# Patient Record
Sex: MALE | Race: WHITE | NOT HISPANIC OR LATINO | URBAN - METROPOLITAN AREA
[De-identification: names, ages, dates, MRNs, and addresses within clinical notes are randomized per-mention and may not be internally consistent; named-entity substitution may affect disease eponyms.]

---

## 2017-08-08 ENCOUNTER — OUTPATIENT (OUTPATIENT)
Dept: OUTPATIENT SERVICES | Facility: HOSPITAL | Age: 72
LOS: 1 days | Discharge: HOME | End: 2017-08-08

## 2017-08-08 DIAGNOSIS — L89.90 PRESSURE ULCER OF UNSPECIFIED SITE, UNSPECIFIED STAGE: ICD-10-CM

## 2017-08-09 ENCOUNTER — OUTPATIENT (OUTPATIENT)
Dept: OUTPATIENT SERVICES | Facility: HOSPITAL | Age: 72
LOS: 1 days | Discharge: HOME | End: 2017-08-09
Payer: MEDICARE

## 2017-08-09 ENCOUNTER — APPOINTMENT (OUTPATIENT)
Dept: RADIOLOGY | Facility: HOSPITAL | Age: 72
End: 2017-08-09

## 2017-08-09 DIAGNOSIS — M79.671 PAIN IN RIGHT FOOT: ICD-10-CM

## 2017-08-09 PROBLEM — Z00.00 ENCOUNTER FOR PREVENTIVE HEALTH EXAMINATION: Status: ACTIVE | Noted: 2017-08-09

## 2017-08-09 PROCEDURE — 73630 X-RAY EXAM OF FOOT: CPT | Mod: 26,RT

## 2017-08-10 DIAGNOSIS — I82.409 ACUTE EMBOLISM AND THROMBOSIS OF UNSPECIFIED DEEP VEINS OF UNSPECIFIED LOWER EXTREMITY: ICD-10-CM

## 2017-08-10 DIAGNOSIS — I25.2 OLD MYOCARDIAL INFARCTION: ICD-10-CM

## 2017-08-10 DIAGNOSIS — Z79.4 LONG TERM (CURRENT) USE OF INSULIN: ICD-10-CM

## 2017-08-10 DIAGNOSIS — Z79.899 OTHER LONG TERM (CURRENT) DRUG THERAPY: ICD-10-CM

## 2017-08-10 DIAGNOSIS — Z83.3 FAMILY HISTORY OF DIABETES MELLITUS: ICD-10-CM

## 2017-08-10 DIAGNOSIS — E11.40 TYPE 2 DIABETES MELLITUS WITH DIABETIC NEUROPATHY, UNSPECIFIED: ICD-10-CM

## 2017-08-10 DIAGNOSIS — I10 ESSENTIAL (PRIMARY) HYPERTENSION: ICD-10-CM

## 2017-08-10 DIAGNOSIS — I49.9 CARDIAC ARRHYTHMIA, UNSPECIFIED: ICD-10-CM

## 2017-08-10 DIAGNOSIS — Z99.2 DEPENDENCE ON RENAL DIALYSIS: ICD-10-CM

## 2017-08-10 DIAGNOSIS — Z79.82 LONG TERM (CURRENT) USE OF ASPIRIN: ICD-10-CM

## 2017-08-10 DIAGNOSIS — E11.621 TYPE 2 DIABETES MELLITUS WITH FOOT ULCER: ICD-10-CM

## 2017-08-10 DIAGNOSIS — Z98.49 CATARACT EXTRACTION STATUS, UNSPECIFIED EYE: ICD-10-CM

## 2017-08-10 DIAGNOSIS — Z82.49 FAMILY HISTORY OF ISCHEMIC HEART DISEASE AND OTHER DISEASES OF THE CIRCULATORY SYSTEM: ICD-10-CM

## 2017-08-10 DIAGNOSIS — L97.511 NON-PRESSURE CHRONIC ULCER OF OTHER PART OF RIGHT FOOT LIMITED TO BREAKDOWN OF SKIN: ICD-10-CM

## 2017-08-10 DIAGNOSIS — E11.51 TYPE 2 DIABETES MELLITUS WITH DIABETIC PERIPHERAL ANGIOPATHY WITHOUT GANGRENE: ICD-10-CM

## 2017-08-10 DIAGNOSIS — Z87.891 PERSONAL HISTORY OF NICOTINE DEPENDENCE: ICD-10-CM

## 2017-08-10 DIAGNOSIS — Z95.1 PRESENCE OF AORTOCORONARY BYPASS GRAFT: ICD-10-CM

## 2017-09-29 ENCOUNTER — OUTPATIENT (OUTPATIENT)
Dept: OUTPATIENT SERVICES | Facility: HOSPITAL | Age: 72
LOS: 1 days | Discharge: HOME | End: 2017-09-29

## 2017-09-29 DIAGNOSIS — L89.90 PRESSURE ULCER OF UNSPECIFIED SITE, UNSPECIFIED STAGE: ICD-10-CM

## 2017-10-03 ENCOUNTER — OUTPATIENT (OUTPATIENT)
Dept: OUTPATIENT SERVICES | Facility: HOSPITAL | Age: 72
LOS: 1 days | Discharge: HOME | End: 2017-10-03
Payer: MEDICARE

## 2017-10-03 DIAGNOSIS — E11.40 TYPE 2 DIABETES MELLITUS WITH DIABETIC NEUROPATHY, UNSPECIFIED: ICD-10-CM

## 2017-10-03 DIAGNOSIS — E11.621 TYPE 2 DIABETES MELLITUS WITH FOOT ULCER: ICD-10-CM

## 2017-10-03 DIAGNOSIS — L97.511 NON-PRESSURE CHRONIC ULCER OF OTHER PART OF RIGHT FOOT LIMITED TO BREAKDOWN OF SKIN: ICD-10-CM

## 2017-10-03 DIAGNOSIS — Z99.2 DEPENDENCE ON RENAL DIALYSIS: ICD-10-CM

## 2017-10-03 DIAGNOSIS — Z79.4 LONG TERM (CURRENT) USE OF INSULIN: ICD-10-CM

## 2017-10-03 DIAGNOSIS — I25.2 OLD MYOCARDIAL INFARCTION: ICD-10-CM

## 2017-10-03 DIAGNOSIS — Z79.899 OTHER LONG TERM (CURRENT) DRUG THERAPY: ICD-10-CM

## 2017-10-03 DIAGNOSIS — Z98.49 CATARACT EXTRACTION STATUS, UNSPECIFIED EYE: ICD-10-CM

## 2017-10-03 DIAGNOSIS — Z83.3 FAMILY HISTORY OF DIABETES MELLITUS: ICD-10-CM

## 2017-10-03 DIAGNOSIS — E11.51 TYPE 2 DIABETES MELLITUS WITH DIABETIC PERIPHERAL ANGIOPATHY WITHOUT GANGRENE: ICD-10-CM

## 2017-10-03 DIAGNOSIS — S91.301A UNSPECIFIED OPEN WOUND, RIGHT FOOT, INITIAL ENCOUNTER: ICD-10-CM

## 2017-10-03 DIAGNOSIS — Z82.49 FAMILY HISTORY OF ISCHEMIC HEART DISEASE AND OTHER DISEASES OF THE CIRCULATORY SYSTEM: ICD-10-CM

## 2017-10-03 DIAGNOSIS — Z95.1 PRESENCE OF AORTOCORONARY BYPASS GRAFT: ICD-10-CM

## 2017-10-03 DIAGNOSIS — Z79.82 LONG TERM (CURRENT) USE OF ASPIRIN: ICD-10-CM

## 2017-10-03 DIAGNOSIS — I82.409 ACUTE EMBOLISM AND THROMBOSIS OF UNSPECIFIED DEEP VEINS OF UNSPECIFIED LOWER EXTREMITY: ICD-10-CM

## 2017-10-03 DIAGNOSIS — I49.9 CARDIAC ARRHYTHMIA, UNSPECIFIED: ICD-10-CM

## 2017-10-03 DIAGNOSIS — N18.6 END STAGE RENAL DISEASE: ICD-10-CM

## 2017-10-03 DIAGNOSIS — Z87.891 PERSONAL HISTORY OF NICOTINE DEPENDENCE: ICD-10-CM

## 2017-10-03 DIAGNOSIS — I12.0 HYPERTENSIVE CHRONIC KIDNEY DISEASE WITH STAGE 5 CHRONIC KIDNEY DISEASE OR END STAGE RENAL DISEASE: ICD-10-CM

## 2017-10-03 PROCEDURE — 73630 X-RAY EXAM OF FOOT: CPT | Mod: 26,RT

## 2017-10-06 ENCOUNTER — OUTPATIENT (OUTPATIENT)
Dept: OUTPATIENT SERVICES | Facility: HOSPITAL | Age: 72
LOS: 1 days | Discharge: HOME | End: 2017-10-06

## 2017-10-06 DIAGNOSIS — L89.90 PRESSURE ULCER OF UNSPECIFIED SITE, UNSPECIFIED STAGE: ICD-10-CM

## 2017-10-10 DIAGNOSIS — Z99.2 DEPENDENCE ON RENAL DIALYSIS: ICD-10-CM

## 2017-10-10 DIAGNOSIS — Z79.899 OTHER LONG TERM (CURRENT) DRUG THERAPY: ICD-10-CM

## 2017-10-10 DIAGNOSIS — Z98.49 CATARACT EXTRACTION STATUS, UNSPECIFIED EYE: ICD-10-CM

## 2017-10-10 DIAGNOSIS — N18.6 END STAGE RENAL DISEASE: ICD-10-CM

## 2017-10-10 DIAGNOSIS — I49.9 CARDIAC ARRHYTHMIA, UNSPECIFIED: ICD-10-CM

## 2017-10-10 DIAGNOSIS — E11.51 TYPE 2 DIABETES MELLITUS WITH DIABETIC PERIPHERAL ANGIOPATHY WITHOUT GANGRENE: ICD-10-CM

## 2017-10-10 DIAGNOSIS — E11.621 TYPE 2 DIABETES MELLITUS WITH FOOT ULCER: ICD-10-CM

## 2017-10-10 DIAGNOSIS — I82.409 ACUTE EMBOLISM AND THROMBOSIS OF UNSPECIFIED DEEP VEINS OF UNSPECIFIED LOWER EXTREMITY: ICD-10-CM

## 2017-10-10 DIAGNOSIS — Z79.4 LONG TERM (CURRENT) USE OF INSULIN: ICD-10-CM

## 2017-10-10 DIAGNOSIS — I25.2 OLD MYOCARDIAL INFARCTION: ICD-10-CM

## 2017-10-10 DIAGNOSIS — Z79.82 LONG TERM (CURRENT) USE OF ASPIRIN: ICD-10-CM

## 2017-10-10 DIAGNOSIS — Z95.1 PRESENCE OF AORTOCORONARY BYPASS GRAFT: ICD-10-CM

## 2017-10-10 DIAGNOSIS — E11.40 TYPE 2 DIABETES MELLITUS WITH DIABETIC NEUROPATHY, UNSPECIFIED: ICD-10-CM

## 2017-10-10 DIAGNOSIS — Z87.891 PERSONAL HISTORY OF NICOTINE DEPENDENCE: ICD-10-CM

## 2017-10-10 DIAGNOSIS — L97.511 NON-PRESSURE CHRONIC ULCER OF OTHER PART OF RIGHT FOOT LIMITED TO BREAKDOWN OF SKIN: ICD-10-CM

## 2017-10-10 DIAGNOSIS — H26.9 UNSPECIFIED CATARACT: ICD-10-CM

## 2017-10-10 DIAGNOSIS — I12.0 HYPERTENSIVE CHRONIC KIDNEY DISEASE WITH STAGE 5 CHRONIC KIDNEY DISEASE OR END STAGE RENAL DISEASE: ICD-10-CM

## 2017-10-20 ENCOUNTER — OUTPATIENT (OUTPATIENT)
Dept: OUTPATIENT SERVICES | Facility: HOSPITAL | Age: 72
LOS: 1 days | Discharge: HOME | End: 2017-10-20

## 2017-10-20 DIAGNOSIS — L89.90 PRESSURE ULCER OF UNSPECIFIED SITE, UNSPECIFIED STAGE: ICD-10-CM

## 2017-10-24 DIAGNOSIS — E11.40 TYPE 2 DIABETES MELLITUS WITH DIABETIC NEUROPATHY, UNSPECIFIED: ICD-10-CM

## 2017-10-24 DIAGNOSIS — N18.6 END STAGE RENAL DISEASE: ICD-10-CM

## 2017-10-24 DIAGNOSIS — I49.9 CARDIAC ARRHYTHMIA, UNSPECIFIED: ICD-10-CM

## 2017-10-24 DIAGNOSIS — E11.51 TYPE 2 DIABETES MELLITUS WITH DIABETIC PERIPHERAL ANGIOPATHY WITHOUT GANGRENE: ICD-10-CM

## 2017-10-24 DIAGNOSIS — L97.511 NON-PRESSURE CHRONIC ULCER OF OTHER PART OF RIGHT FOOT LIMITED TO BREAKDOWN OF SKIN: ICD-10-CM

## 2017-10-24 DIAGNOSIS — Z79.82 LONG TERM (CURRENT) USE OF ASPIRIN: ICD-10-CM

## 2017-10-24 DIAGNOSIS — I25.2 OLD MYOCARDIAL INFARCTION: ICD-10-CM

## 2017-10-24 DIAGNOSIS — Z99.2 DEPENDENCE ON RENAL DIALYSIS: ICD-10-CM

## 2017-10-24 DIAGNOSIS — Z79.4 LONG TERM (CURRENT) USE OF INSULIN: ICD-10-CM

## 2017-10-24 DIAGNOSIS — Z87.891 PERSONAL HISTORY OF NICOTINE DEPENDENCE: ICD-10-CM

## 2017-10-24 DIAGNOSIS — H26.9 UNSPECIFIED CATARACT: ICD-10-CM

## 2017-10-24 DIAGNOSIS — Z79.899 OTHER LONG TERM (CURRENT) DRUG THERAPY: ICD-10-CM

## 2017-10-24 DIAGNOSIS — Z98.49 CATARACT EXTRACTION STATUS, UNSPECIFIED EYE: ICD-10-CM

## 2017-10-24 DIAGNOSIS — E11.621 TYPE 2 DIABETES MELLITUS WITH FOOT ULCER: ICD-10-CM

## 2017-10-24 DIAGNOSIS — I82.409 ACUTE EMBOLISM AND THROMBOSIS OF UNSPECIFIED DEEP VEINS OF UNSPECIFIED LOWER EXTREMITY: ICD-10-CM

## 2017-10-24 DIAGNOSIS — Z95.1 PRESENCE OF AORTOCORONARY BYPASS GRAFT: ICD-10-CM

## 2017-10-24 DIAGNOSIS — I12.0 HYPERTENSIVE CHRONIC KIDNEY DISEASE WITH STAGE 5 CHRONIC KIDNEY DISEASE OR END STAGE RENAL DISEASE: ICD-10-CM

## 2017-10-27 ENCOUNTER — OUTPATIENT (OUTPATIENT)
Dept: OUTPATIENT SERVICES | Facility: HOSPITAL | Age: 72
LOS: 1 days | Discharge: HOME | End: 2017-10-27

## 2017-10-27 DIAGNOSIS — L89.90 PRESSURE ULCER OF UNSPECIFIED SITE, UNSPECIFIED STAGE: ICD-10-CM

## 2017-10-31 DIAGNOSIS — E11.51 TYPE 2 DIABETES MELLITUS WITH DIABETIC PERIPHERAL ANGIOPATHY WITHOUT GANGRENE: ICD-10-CM

## 2017-10-31 DIAGNOSIS — N18.6 END STAGE RENAL DISEASE: ICD-10-CM

## 2017-10-31 DIAGNOSIS — Z79.82 LONG TERM (CURRENT) USE OF ASPIRIN: ICD-10-CM

## 2017-10-31 DIAGNOSIS — I49.9 CARDIAC ARRHYTHMIA, UNSPECIFIED: ICD-10-CM

## 2017-10-31 DIAGNOSIS — L97.511 NON-PRESSURE CHRONIC ULCER OF OTHER PART OF RIGHT FOOT LIMITED TO BREAKDOWN OF SKIN: ICD-10-CM

## 2017-10-31 DIAGNOSIS — E11.621 TYPE 2 DIABETES MELLITUS WITH FOOT ULCER: ICD-10-CM

## 2017-10-31 DIAGNOSIS — Z79.4 LONG TERM (CURRENT) USE OF INSULIN: ICD-10-CM

## 2017-10-31 DIAGNOSIS — Z99.2 DEPENDENCE ON RENAL DIALYSIS: ICD-10-CM

## 2017-10-31 DIAGNOSIS — I12.0 HYPERTENSIVE CHRONIC KIDNEY DISEASE WITH STAGE 5 CHRONIC KIDNEY DISEASE OR END STAGE RENAL DISEASE: ICD-10-CM

## 2017-10-31 DIAGNOSIS — I25.2 OLD MYOCARDIAL INFARCTION: ICD-10-CM

## 2017-10-31 DIAGNOSIS — E11.40 TYPE 2 DIABETES MELLITUS WITH DIABETIC NEUROPATHY, UNSPECIFIED: ICD-10-CM

## 2017-10-31 DIAGNOSIS — Z79.899 OTHER LONG TERM (CURRENT) DRUG THERAPY: ICD-10-CM

## 2017-10-31 DIAGNOSIS — H26.9 UNSPECIFIED CATARACT: ICD-10-CM

## 2017-10-31 DIAGNOSIS — Z95.1 PRESENCE OF AORTOCORONARY BYPASS GRAFT: ICD-10-CM

## 2020-01-01 ENCOUNTER — INPATIENT (INPATIENT)
Facility: HOSPITAL | Age: 75
LOS: 2 days | End: 2020-07-05
Attending: INTERNAL MEDICINE | Admitting: INTERNAL MEDICINE
Payer: MEDICARE

## 2020-01-01 ENCOUNTER — OUTPATIENT (OUTPATIENT)
Dept: OUTPATIENT SERVICES | Facility: HOSPITAL | Age: 75
LOS: 1 days | Discharge: HOME | End: 2020-01-01

## 2020-01-01 ENCOUNTER — OUTPATIENT (OUTPATIENT)
Dept: OUTPATIENT SERVICES | Facility: HOSPITAL | Age: 75
LOS: 1 days | Discharge: HOME | End: 2020-01-01
Payer: MEDICARE

## 2020-01-01 ENCOUNTER — APPOINTMENT (OUTPATIENT)
Dept: MRI IMAGING | Facility: HOSPITAL | Age: 75
End: 2020-01-01

## 2020-01-01 VITALS — HEIGHT: 66 IN | WEIGHT: 149.91 LBS

## 2020-01-01 DIAGNOSIS — E87.4 MIXED DISORDER OF ACID-BASE BALANCE: ICD-10-CM

## 2020-01-01 DIAGNOSIS — Z79.4 LONG TERM (CURRENT) USE OF INSULIN: ICD-10-CM

## 2020-01-01 DIAGNOSIS — Z86.718 PERSONAL HISTORY OF OTHER VENOUS THROMBOSIS AND EMBOLISM: ICD-10-CM

## 2020-01-01 DIAGNOSIS — N18.6 END STAGE RENAL DISEASE: ICD-10-CM

## 2020-01-01 DIAGNOSIS — I49.9 CARDIAC ARRHYTHMIA, UNSPECIFIED: ICD-10-CM

## 2020-01-01 DIAGNOSIS — L89.90 PRESSURE ULCER OF UNSPECIFIED SITE, UNSPECIFIED STAGE: ICD-10-CM

## 2020-01-01 DIAGNOSIS — Z87.891 PERSONAL HISTORY OF NICOTINE DEPENDENCE: ICD-10-CM

## 2020-01-01 DIAGNOSIS — J98.11 ATELECTASIS: ICD-10-CM

## 2020-01-01 DIAGNOSIS — E11.22 TYPE 2 DIABETES MELLITUS WITH DIABETIC CHRONIC KIDNEY DISEASE: ICD-10-CM

## 2020-01-01 DIAGNOSIS — E11.621 TYPE 2 DIABETES MELLITUS WITH FOOT ULCER: ICD-10-CM

## 2020-01-01 DIAGNOSIS — J96.02 ACUTE RESPIRATORY FAILURE WITH HYPERCAPNIA: ICD-10-CM

## 2020-01-01 DIAGNOSIS — E11.40 TYPE 2 DIABETES MELLITUS WITH DIABETIC NEUROPATHY, UNSPECIFIED: ICD-10-CM

## 2020-01-01 DIAGNOSIS — Z79.899 OTHER LONG TERM (CURRENT) DRUG THERAPY: ICD-10-CM

## 2020-01-01 DIAGNOSIS — Z79.01 LONG TERM (CURRENT) USE OF ANTICOAGULANTS: ICD-10-CM

## 2020-01-01 DIAGNOSIS — Z99.2 DEPENDENCE ON RENAL DIALYSIS: ICD-10-CM

## 2020-01-01 DIAGNOSIS — L89.152 PRESSURE ULCER OF SACRAL REGION, STAGE 2: ICD-10-CM

## 2020-01-01 DIAGNOSIS — I46.9 CARDIAC ARREST, CAUSE UNSPECIFIED: ICD-10-CM

## 2020-01-01 DIAGNOSIS — I51.7 CARDIOMEGALY: ICD-10-CM

## 2020-01-01 DIAGNOSIS — I21.4 NON-ST ELEVATION (NSTEMI) MYOCARDIAL INFARCTION: ICD-10-CM

## 2020-01-01 DIAGNOSIS — M86.171 OTHER ACUTE OSTEOMYELITIS, RIGHT ANKLE AND FOOT: ICD-10-CM

## 2020-01-01 DIAGNOSIS — L97.511 NON-PRESSURE CHRONIC ULCER OF OTHER PART OF RIGHT FOOT LIMITED TO BREAKDOWN OF SKIN: ICD-10-CM

## 2020-01-01 DIAGNOSIS — S22.41XA MULTIPLE FRACTURES OF RIBS, RIGHT SIDE, INITIAL ENCOUNTER FOR CLOSED FRACTURE: ICD-10-CM

## 2020-01-01 DIAGNOSIS — J96.01 ACUTE RESPIRATORY FAILURE WITH HYPOXIA: ICD-10-CM

## 2020-01-01 DIAGNOSIS — I12.0 HYPERTENSIVE CHRONIC KIDNEY DISEASE WITH STAGE 5 CHRONIC KIDNEY DISEASE OR END STAGE RENAL DISEASE: ICD-10-CM

## 2020-01-01 DIAGNOSIS — I95.9 HYPOTENSION, UNSPECIFIED: ICD-10-CM

## 2020-01-01 DIAGNOSIS — M86.9 OSTEOMYELITIS, UNSPECIFIED: ICD-10-CM

## 2020-01-01 DIAGNOSIS — E11.65 TYPE 2 DIABETES MELLITUS WITH HYPERGLYCEMIA: ICD-10-CM

## 2020-01-01 DIAGNOSIS — Z82.49 FAMILY HISTORY OF ISCHEMIC HEART DISEASE AND OTHER DISEASES OF THE CIRCULATORY SYSTEM: ICD-10-CM

## 2020-01-01 DIAGNOSIS — Z66 DO NOT RESUSCITATE: ICD-10-CM

## 2020-01-01 DIAGNOSIS — I25.2 OLD MYOCARDIAL INFARCTION: ICD-10-CM

## 2020-01-01 DIAGNOSIS — Z98.49 CATARACT EXTRACTION STATUS, UNSPECIFIED EYE: ICD-10-CM

## 2020-01-01 DIAGNOSIS — Z95.1 PRESENCE OF AORTOCORONARY BYPASS GRAFT: ICD-10-CM

## 2020-01-01 DIAGNOSIS — J90 PLEURAL EFFUSION, NOT ELSEWHERE CLASSIFIED: ICD-10-CM

## 2020-01-01 DIAGNOSIS — I46.8 CARDIAC ARREST DUE TO OTHER UNDERLYING CONDITION: ICD-10-CM

## 2020-01-01 DIAGNOSIS — I48.91 UNSPECIFIED ATRIAL FIBRILLATION: ICD-10-CM

## 2020-01-01 DIAGNOSIS — I25.10 ATHEROSCLEROTIC HEART DISEASE OF NATIVE CORONARY ARTERY WITHOUT ANGINA PECTORIS: ICD-10-CM

## 2020-01-01 DIAGNOSIS — M79.674 PAIN IN RIGHT TOE(S): ICD-10-CM
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
## 2020-01-01 DIAGNOSIS — J69.0 PNEUMONITIS DUE TO INHALATION OF FOOD AND VOMIT: ICD-10-CM

## 2020-01-01 DIAGNOSIS — E87.70 FLUID OVERLOAD, UNSPECIFIED: ICD-10-CM
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
## 2020-01-01 DIAGNOSIS — G93.1 ANOXIC BRAIN DAMAGE, NOT ELSEWHERE CLASSIFIED: ICD-10-CM

## 2020-01-01 LAB
A1C WITH ESTIMATED AVERAGE GLUCOSE RESULT: 9.1 % — HIGH (ref 4–5.6)
ALBUMIN SERPL ELPH-MCNC: 2.1 G/DL — LOW (ref 3.3–5)
ALBUMIN SERPL ELPH-MCNC: 2.4 G/DL — LOW (ref 3.3–5)
ALBUMIN SERPL ELPH-MCNC: 2.6 G/DL — LOW (ref 3.3–5)
ALBUMIN SERPL ELPH-MCNC: 2.7 G/DL — LOW (ref 3.3–5)
ALP SERPL-CCNC: 111 U/L — SIGNIFICANT CHANGE UP (ref 40–120)
ALP SERPL-CCNC: 153 U/L — HIGH (ref 40–120)
ALP SERPL-CCNC: 162 U/L — HIGH (ref 40–120)
ALP SERPL-CCNC: 172 U/L — HIGH (ref 40–120)
ALT FLD-CCNC: 53 U/L — SIGNIFICANT CHANGE UP (ref 12–78)
ALT FLD-CCNC: 61 U/L — SIGNIFICANT CHANGE UP (ref 12–78)
ALT FLD-CCNC: 68 U/L — SIGNIFICANT CHANGE UP (ref 12–78)
ALT FLD-CCNC: 69 U/L — SIGNIFICANT CHANGE UP (ref 12–78)
ANION GAP SERPL CALC-SCNC: 10 MMOL/L — SIGNIFICANT CHANGE UP (ref 5–17)
ANION GAP SERPL CALC-SCNC: 12 MMOL/L — SIGNIFICANT CHANGE UP (ref 5–17)
ANION GAP SERPL CALC-SCNC: 13 MMOL/L — SIGNIFICANT CHANGE UP (ref 5–17)
ANION GAP SERPL CALC-SCNC: 14 MMOL/L — SIGNIFICANT CHANGE UP (ref 5–17)
ANION GAP SERPL CALC-SCNC: 14 MMOL/L — SIGNIFICANT CHANGE UP (ref 5–17)
ANISOCYTOSIS BLD QL: SLIGHT — SIGNIFICANT CHANGE UP
APTT BLD: 28.8 SEC — SIGNIFICANT CHANGE UP (ref 27.5–35.5)
APTT BLD: 30.7 SEC — SIGNIFICANT CHANGE UP (ref 27.5–35.5)
APTT BLD: 30.8 SEC — SIGNIFICANT CHANGE UP (ref 27.5–35.5)
APTT BLD: 48.6 SEC — HIGH (ref 27.5–35.5)
APTT BLD: 52.3 SEC — HIGH (ref 27.5–35.5)
APTT BLD: 56.1 SEC — HIGH (ref 27.5–35.5)
APTT BLD: 58.7 SEC — HIGH (ref 27.5–35.5)
APTT BLD: 78 SEC — HIGH (ref 27.5–35.5)
APTT BLD: 94.8 SEC — HIGH (ref 27.5–35.5)
AST SERPL-CCNC: 109 U/L — HIGH (ref 15–37)
AST SERPL-CCNC: 82 U/L — HIGH (ref 15–37)
AST SERPL-CCNC: 86 U/L — HIGH (ref 15–37)
AST SERPL-CCNC: 98 U/L — HIGH (ref 15–37)
B-OH-BUTYR SERPL-SCNC: 0.9 MMOL/L — HIGH
BASE EXCESS BLDA CALC-SCNC: -7.2 MMOL/L — LOW (ref -2–2)
BASE EXCESS BLDA CALC-SCNC: 1.9 MMOL/L — SIGNIFICANT CHANGE UP (ref -2–2)
BASE EXCESS BLDA CALC-SCNC: 2 MMOL/L — SIGNIFICANT CHANGE UP (ref -2–2)
BASE EXCESS BLDV CALC-SCNC: 1.6 MMOL/L — SIGNIFICANT CHANGE UP (ref -2–2)
BASOPHILS # BLD AUTO: 0 K/UL — SIGNIFICANT CHANGE UP (ref 0–0.2)
BASOPHILS # BLD AUTO: 0.01 K/UL — SIGNIFICANT CHANGE UP (ref 0–0.2)
BASOPHILS NFR BLD AUTO: 0 % — SIGNIFICANT CHANGE UP (ref 0–2)
BASOPHILS NFR BLD AUTO: 0.1 % — SIGNIFICANT CHANGE UP (ref 0–2)
BILIRUB SERPL-MCNC: 0.5 MG/DL — SIGNIFICANT CHANGE UP (ref 0.2–1.2)
BILIRUB SERPL-MCNC: 0.6 MG/DL — SIGNIFICANT CHANGE UP (ref 0.2–1.2)
BILIRUB SERPL-MCNC: 0.6 MG/DL — SIGNIFICANT CHANGE UP (ref 0.2–1.2)
BILIRUB SERPL-MCNC: 0.7 MG/DL — SIGNIFICANT CHANGE UP (ref 0.2–1.2)
BLD GP AB SCN SERPL QL: SIGNIFICANT CHANGE UP
BLOOD GAS COMMENTS, VENOUS: SIGNIFICANT CHANGE UP
BLOOD GAS COMMENTS, VENOUS: SIGNIFICANT CHANGE UP
BLOOD GAS COMMENTS: SIGNIFICANT CHANGE UP
BLOOD GAS SOURCE: SIGNIFICANT CHANGE UP
BUN SERPL-MCNC: 51 MG/DL — HIGH (ref 7–23)
BUN SERPL-MCNC: 53 MG/DL — HIGH (ref 7–23)
BUN SERPL-MCNC: 53 MG/DL — HIGH (ref 7–23)
BUN SERPL-MCNC: 62 MG/DL — HIGH (ref 7–23)
BUN SERPL-MCNC: 75 MG/DL — HIGH (ref 7–23)
CALCIUM SERPL-MCNC: 8.4 MG/DL — LOW (ref 8.5–10.1)
CALCIUM SERPL-MCNC: 8.6 MG/DL — SIGNIFICANT CHANGE UP (ref 8.5–10.1)
CALCIUM SERPL-MCNC: 9 MG/DL — SIGNIFICANT CHANGE UP (ref 8.5–10.1)
CALCIUM SERPL-MCNC: 9 MG/DL — SIGNIFICANT CHANGE UP (ref 8.5–10.1)
CALCIUM SERPL-MCNC: 9.2 MG/DL — SIGNIFICANT CHANGE UP (ref 8.5–10.1)
CHLORIDE SERPL-SCNC: 101 MMOL/L — SIGNIFICANT CHANGE UP (ref 96–108)
CHLORIDE SERPL-SCNC: 95 MMOL/L — LOW (ref 96–108)
CHLORIDE SERPL-SCNC: 96 MMOL/L — SIGNIFICANT CHANGE UP (ref 96–108)
CHLORIDE SERPL-SCNC: 97 MMOL/L — SIGNIFICANT CHANGE UP (ref 96–108)
CHLORIDE SERPL-SCNC: 98 MMOL/L — SIGNIFICANT CHANGE UP (ref 96–108)
CK MB BLD-MCNC: 4.9 % — HIGH (ref 0–3.5)
CK MB CFR SERPL CALC: 23.5 NG/ML — HIGH (ref 0.5–3.6)
CK SERPL-CCNC: 430 U/L — HIGH (ref 26–308)
CK SERPL-CCNC: 478 U/L — HIGH (ref 26–308)
CO2 SERPL-SCNC: 23 MMOL/L — SIGNIFICANT CHANGE UP (ref 22–31)
CO2 SERPL-SCNC: 24 MMOL/L — SIGNIFICANT CHANGE UP (ref 22–31)
CO2 SERPL-SCNC: 26 MMOL/L — SIGNIFICANT CHANGE UP (ref 22–31)
CO2 SERPL-SCNC: 28 MMOL/L — SIGNIFICANT CHANGE UP (ref 22–31)
CO2 SERPL-SCNC: 28 MMOL/L — SIGNIFICANT CHANGE UP (ref 22–31)
CREAT SERPL-MCNC: 5.6 MG/DL — HIGH (ref 0.5–1.3)
CREAT SERPL-MCNC: 5.61 MG/DL — HIGH (ref 0.5–1.3)
CREAT SERPL-MCNC: 5.71 MG/DL — HIGH (ref 0.5–1.3)
CREAT SERPL-MCNC: 5.92 MG/DL — HIGH (ref 0.5–1.3)
CREAT SERPL-MCNC: 6.57 MG/DL — HIGH (ref 0.5–1.3)
CRP SERPL-MCNC: 1.16 MG/DL — HIGH (ref 0–0.4)
CULTURE RESULTS: SIGNIFICANT CHANGE UP
EOSINOPHIL # BLD AUTO: 0 K/UL — SIGNIFICANT CHANGE UP (ref 0–0.5)
EOSINOPHIL # BLD AUTO: 0.01 K/UL — SIGNIFICANT CHANGE UP (ref 0–0.5)
EOSINOPHIL NFR BLD AUTO: 0 % — SIGNIFICANT CHANGE UP (ref 0–6)
EOSINOPHIL NFR BLD AUTO: 0.1 % — SIGNIFICANT CHANGE UP (ref 0–6)
ESTIMATED AVERAGE GLUCOSE: 214 MG/DL — HIGH (ref 68–114)
FERRITIN SERPL-MCNC: 1569 NG/ML — HIGH (ref 30–400)
GAS PNL BLDV: SIGNIFICANT CHANGE UP
GLUCOSE BLDC GLUCOMTR-MCNC: 101 MG/DL — HIGH (ref 70–99)
GLUCOSE BLDC GLUCOMTR-MCNC: 114 MG/DL — HIGH (ref 70–99)
GLUCOSE BLDC GLUCOMTR-MCNC: 118 MG/DL — HIGH (ref 70–99)
GLUCOSE BLDC GLUCOMTR-MCNC: 124 MG/DL — HIGH (ref 70–99)
GLUCOSE BLDC GLUCOMTR-MCNC: 130 MG/DL — HIGH (ref 70–99)
GLUCOSE BLDC GLUCOMTR-MCNC: 140 MG/DL — HIGH (ref 70–99)
GLUCOSE BLDC GLUCOMTR-MCNC: 150 MG/DL — HIGH (ref 70–99)
GLUCOSE BLDC GLUCOMTR-MCNC: 152 MG/DL — HIGH (ref 70–99)
GLUCOSE BLDC GLUCOMTR-MCNC: 163 MG/DL — HIGH (ref 70–99)
GLUCOSE BLDC GLUCOMTR-MCNC: 173 MG/DL — HIGH (ref 70–99)
GLUCOSE BLDC GLUCOMTR-MCNC: 178 MG/DL — HIGH (ref 70–99)
GLUCOSE BLDC GLUCOMTR-MCNC: 185 MG/DL — HIGH (ref 70–99)
GLUCOSE BLDC GLUCOMTR-MCNC: 196 MG/DL — HIGH (ref 70–99)
GLUCOSE BLDC GLUCOMTR-MCNC: 208 MG/DL — HIGH (ref 70–99)
GLUCOSE BLDC GLUCOMTR-MCNC: 216 MG/DL — HIGH (ref 70–99)
GLUCOSE BLDC GLUCOMTR-MCNC: 222 MG/DL — HIGH (ref 70–99)
GLUCOSE BLDC GLUCOMTR-MCNC: 230 MG/DL — HIGH (ref 70–99)
GLUCOSE BLDC GLUCOMTR-MCNC: 254 MG/DL — HIGH (ref 70–99)
GLUCOSE BLDC GLUCOMTR-MCNC: 258 MG/DL — HIGH (ref 70–99)
GLUCOSE BLDC GLUCOMTR-MCNC: 369 MG/DL — HIGH (ref 70–99)
GLUCOSE BLDC GLUCOMTR-MCNC: 370 MG/DL — HIGH (ref 70–99)
GLUCOSE BLDC GLUCOMTR-MCNC: 407 MG/DL — HIGH (ref 70–99)
GLUCOSE BLDC GLUCOMTR-MCNC: 466 MG/DL — CRITICAL HIGH (ref 70–99)
GLUCOSE BLDC GLUCOMTR-MCNC: 479 MG/DL — CRITICAL HIGH (ref 70–99)
GLUCOSE BLDC GLUCOMTR-MCNC: 484 MG/DL — CRITICAL HIGH (ref 70–99)
GLUCOSE BLDC GLUCOMTR-MCNC: 488 MG/DL — CRITICAL HIGH (ref 70–99)
GLUCOSE BLDC GLUCOMTR-MCNC: 504 MG/DL — CRITICAL HIGH (ref 70–99)
GLUCOSE BLDC GLUCOMTR-MCNC: 65 MG/DL — LOW (ref 70–99)
GLUCOSE BLDC GLUCOMTR-MCNC: 69 MG/DL — LOW (ref 70–99)
GLUCOSE SERPL-MCNC: 110 MG/DL — HIGH (ref 70–99)
GLUCOSE SERPL-MCNC: 183 MG/DL — HIGH (ref 70–99)
GLUCOSE SERPL-MCNC: 299 MG/DL — HIGH (ref 70–99)
GLUCOSE SERPL-MCNC: 498 MG/DL — CRITICAL HIGH (ref 70–99)
GLUCOSE SERPL-MCNC: 498 MG/DL — CRITICAL HIGH (ref 70–99)
GRAM STN FLD: SIGNIFICANT CHANGE UP
GRAM STN FLD: SIGNIFICANT CHANGE UP
HCO3 BLDA-SCNC: 23 MMOL/L — SIGNIFICANT CHANGE UP (ref 21–29)
HCO3 BLDA-SCNC: 25 MMOL/L — SIGNIFICANT CHANGE UP (ref 21–29)
HCO3 BLDA-SCNC: 27 MMOL/L — SIGNIFICANT CHANGE UP (ref 21–29)
HCO3 BLDV-SCNC: 28 MMOL/L — SIGNIFICANT CHANGE UP (ref 21–29)
HCT VFR BLD CALC: 26.8 % — LOW (ref 39–50)
HCT VFR BLD CALC: 31.6 % — LOW (ref 39–50)
HCT VFR BLD CALC: 31.6 % — LOW (ref 39–50)
HCT VFR BLD CALC: 31.9 % — LOW (ref 39–50)
HCT VFR BLD CALC: 31.9 % — LOW (ref 39–50)
HGB BLD-MCNC: 10.4 G/DL — LOW (ref 13–17)
HGB BLD-MCNC: 10.6 G/DL — LOW (ref 13–17)
HGB BLD-MCNC: 10.7 G/DL — LOW (ref 13–17)
HGB BLD-MCNC: 9.1 G/DL — LOW (ref 13–17)
HGB BLD-MCNC: 9.9 G/DL — LOW (ref 13–17)
HOROWITZ INDEX BLDA+IHG-RTO: 100 — SIGNIFICANT CHANGE UP
HOROWITZ INDEX BLDA+IHG-RTO: 100 — SIGNIFICANT CHANGE UP
HOROWITZ INDEX BLDA+IHG-RTO: 40 — SIGNIFICANT CHANGE UP
HOROWITZ INDEX BLDV+IHG-RTO: 100 — SIGNIFICANT CHANGE UP
HYPOCHROMIA BLD QL: SLIGHT — SIGNIFICANT CHANGE UP
IMM GRANULOCYTES NFR BLD AUTO: 0.5 % — SIGNIFICANT CHANGE UP (ref 0–1.5)
INR BLD: 2.01 RATIO — HIGH (ref 0.88–1.16)
INR BLD: 2.05 RATIO — HIGH (ref 0.88–1.16)
LACTATE SERPL-SCNC: 4 MMOL/L — CRITICAL HIGH (ref 0.7–2)
LDH SERPL L TO P-CCNC: 407 U/L — HIGH (ref 50–242)
LYMPHOCYTES # BLD AUTO: 0.22 K/UL — LOW (ref 1–3.3)
LYMPHOCYTES # BLD AUTO: 0.41 K/UL — LOW (ref 1–3.3)
LYMPHOCYTES # BLD AUTO: 2.7 % — LOW (ref 13–44)
LYMPHOCYTES # BLD AUTO: 4 % — LOW (ref 13–44)
MACROCYTES BLD QL: SLIGHT — SIGNIFICANT CHANGE UP
MAGNESIUM SERPL-MCNC: 2.1 MG/DL — SIGNIFICANT CHANGE UP (ref 1.6–2.6)
MAGNESIUM SERPL-MCNC: 2.1 MG/DL — SIGNIFICANT CHANGE UP (ref 1.6–2.6)
MAGNESIUM SERPL-MCNC: 2.4 MG/DL — SIGNIFICANT CHANGE UP (ref 1.6–2.6)
MAGNESIUM SERPL-MCNC: 2.4 MG/DL — SIGNIFICANT CHANGE UP (ref 1.6–2.6)
MANUAL SMEAR VERIFICATION: SIGNIFICANT CHANGE UP
MCHC RBC-ENTMCNC: 31 GM/DL — LOW (ref 32–36)
MCHC RBC-ENTMCNC: 32.9 GM/DL — SIGNIFICANT CHANGE UP (ref 32–36)
MCHC RBC-ENTMCNC: 33.5 GM/DL — SIGNIFICANT CHANGE UP (ref 32–36)
MCHC RBC-ENTMCNC: 33.5 GM/DL — SIGNIFICANT CHANGE UP (ref 32–36)
MCHC RBC-ENTMCNC: 34 GM/DL — SIGNIFICANT CHANGE UP (ref 32–36)
MCHC RBC-ENTMCNC: 35.4 PG — HIGH (ref 27–34)
MCHC RBC-ENTMCNC: 35.5 PG — HIGH (ref 27–34)
MCHC RBC-ENTMCNC: 35.5 PG — HIGH (ref 27–34)
MCHC RBC-ENTMCNC: 35.7 PG — HIGH (ref 27–34)
MCHC RBC-ENTMCNC: 36 PG — HIGH (ref 27–34)
MCV RBC AUTO: 105.9 FL — HIGH (ref 80–100)
MCV RBC AUTO: 106 FL — HIGH (ref 80–100)
MCV RBC AUTO: 106.4 FL — HIGH (ref 80–100)
MCV RBC AUTO: 107.5 FL — HIGH (ref 80–100)
MCV RBC AUTO: 114.3 FL — HIGH (ref 80–100)
METAMYELOCYTES # FLD: 8 % — HIGH (ref 0–0)
MICROCYTES BLD QL: SLIGHT — SIGNIFICANT CHANGE UP
MONOCYTES # BLD AUTO: 0.21 K/UL — SIGNIFICANT CHANGE UP (ref 0–0.9)
MONOCYTES # BLD AUTO: 0.41 K/UL — SIGNIFICANT CHANGE UP (ref 0–0.9)
MONOCYTES NFR BLD AUTO: 2.6 % — SIGNIFICANT CHANGE UP (ref 2–14)
MONOCYTES NFR BLD AUTO: 4 % — SIGNIFICANT CHANGE UP (ref 2–14)
NEUTROPHILS # BLD AUTO: 7.59 K/UL — HIGH (ref 1.8–7.4)
NEUTROPHILS # BLD AUTO: 8.57 K/UL — HIGH (ref 1.8–7.4)
NEUTROPHILS NFR BLD AUTO: 30 % — LOW (ref 43–77)
NEUTROPHILS NFR BLD AUTO: 94 % — HIGH (ref 43–77)
NEUTS BAND # BLD: 54 % — HIGH (ref 0–8)
NRBC # BLD: 0 /100 WBCS — SIGNIFICANT CHANGE UP (ref 0–0)
NRBC # BLD: 0 /100 — SIGNIFICANT CHANGE UP (ref 0–0)
NRBC # BLD: SIGNIFICANT CHANGE UP /100 WBCS (ref 0–0)
NT-PROBNP SERPL-SCNC: HIGH PG/ML (ref 0–450)
OVALOCYTES BLD QL SMEAR: SLIGHT — SIGNIFICANT CHANGE UP
PCO2 BLDA: 37 MMHG — SIGNIFICANT CHANGE UP (ref 32–46)
PCO2 BLDA: 45 MMHG — SIGNIFICANT CHANGE UP (ref 32–46)
PCO2 BLDA: 75 MMHG — CRITICAL HIGH (ref 32–46)
PCO2 BLDV: 57 MMHG — HIGH (ref 35–50)
PH BLD: 7.12 — CRITICAL LOW (ref 7.35–7.45)
PH BLD: 7.39 — SIGNIFICANT CHANGE UP (ref 7.35–7.45)
PH BLD: 7.46 — HIGH (ref 7.35–7.45)
PH BLDV: 7.31 — LOW (ref 7.35–7.45)
PHOSPHATE SERPL-MCNC: 3.4 MG/DL — SIGNIFICANT CHANGE UP (ref 2.5–4.5)
PHOSPHATE SERPL-MCNC: 3.8 MG/DL — SIGNIFICANT CHANGE UP (ref 2.5–4.5)
PHOSPHATE SERPL-MCNC: 4.7 MG/DL — HIGH (ref 2.5–4.5)
PLAT MORPH BLD: NORMAL — SIGNIFICANT CHANGE UP
PLATELET # BLD AUTO: 128 K/UL — LOW (ref 150–400)
PLATELET # BLD AUTO: 135 K/UL — LOW (ref 150–400)
PLATELET # BLD AUTO: 143 K/UL — LOW (ref 150–400)
PLATELET # BLD AUTO: 147 K/UL — LOW (ref 150–400)
PLATELET # BLD AUTO: 181 K/UL — SIGNIFICANT CHANGE UP (ref 150–400)
PO2 BLDA: 108 MMHG — SIGNIFICANT CHANGE UP (ref 74–108)
PO2 BLDA: 125 MMHG — HIGH (ref 74–108)
PO2 BLDA: 79 MMHG — SIGNIFICANT CHANGE UP (ref 74–108)
PO2 BLDV: <43 MMHG — SIGNIFICANT CHANGE UP (ref 25–45)
POIKILOCYTOSIS BLD QL AUTO: SLIGHT — SIGNIFICANT CHANGE UP
POTASSIUM SERPL-MCNC: 3.5 MMOL/L — SIGNIFICANT CHANGE UP (ref 3.5–5.3)
POTASSIUM SERPL-MCNC: 3.6 MMOL/L — SIGNIFICANT CHANGE UP (ref 3.5–5.3)
POTASSIUM SERPL-MCNC: 4.2 MMOL/L — SIGNIFICANT CHANGE UP (ref 3.5–5.3)
POTASSIUM SERPL-MCNC: 5 MMOL/L — SIGNIFICANT CHANGE UP (ref 3.5–5.3)
POTASSIUM SERPL-MCNC: 5.3 MMOL/L — SIGNIFICANT CHANGE UP (ref 3.5–5.3)
POTASSIUM SERPL-SCNC: 3.5 MMOL/L — SIGNIFICANT CHANGE UP (ref 3.5–5.3)
POTASSIUM SERPL-SCNC: 3.6 MMOL/L — SIGNIFICANT CHANGE UP (ref 3.5–5.3)
POTASSIUM SERPL-SCNC: 4.2 MMOL/L — SIGNIFICANT CHANGE UP (ref 3.5–5.3)
POTASSIUM SERPL-SCNC: 5 MMOL/L — SIGNIFICANT CHANGE UP (ref 3.5–5.3)
POTASSIUM SERPL-SCNC: 5.3 MMOL/L — SIGNIFICANT CHANGE UP (ref 3.5–5.3)
PROCALCITONIN SERPL-MCNC: 1.05 NG/ML — HIGH (ref 0.02–0.1)
PROT SERPL-MCNC: 5.7 GM/DL — LOW (ref 6–8.3)
PROT SERPL-MCNC: 5.9 GM/DL — LOW (ref 6–8.3)
PROT SERPL-MCNC: 6.1 GM/DL — SIGNIFICANT CHANGE UP (ref 6–8.3)
PROT SERPL-MCNC: 6.2 GM/DL — SIGNIFICANT CHANGE UP (ref 6–8.3)
PROTHROM AB SERPL-ACNC: 21.8 SEC — HIGH (ref 10.6–13.6)
PROTHROM AB SERPL-ACNC: 22 SEC — HIGH (ref 10.6–13.6)
RBC # BLD: 2.53 M/UL — LOW (ref 4.2–5.8)
RBC # BLD: 2.79 M/UL — LOW (ref 4.2–5.8)
RBC # BLD: 2.94 M/UL — LOW (ref 4.2–5.8)
RBC # BLD: 2.97 M/UL — LOW (ref 4.2–5.8)
RBC # BLD: 3.01 M/UL — LOW (ref 4.2–5.8)
RBC # FLD: 17.8 % — HIGH (ref 10.3–14.5)
RBC # FLD: 17.8 % — HIGH (ref 10.3–14.5)
RBC # FLD: 17.9 % — HIGH (ref 10.3–14.5)
RBC # FLD: 18 % — HIGH (ref 10.3–14.5)
RBC # FLD: 18.4 % — HIGH (ref 10.3–14.5)
RBC BLD AUTO: ABNORMAL
SAO2 % BLDA: 97 % — HIGH (ref 92–96)
SAO2 % BLDA: 97 % — HIGH (ref 92–96)
SAO2 % BLDA: 98 % — HIGH (ref 92–96)
SAO2 % BLDV: 64 % — LOW (ref 67–88)
SARS-COV-2 IGG SERPL QL IA: NEGATIVE — SIGNIFICANT CHANGE UP
SARS-COV-2 IGM SERPL IA-ACNC: 0.09 INDEX — SIGNIFICANT CHANGE UP
SARS-COV-2 RNA SPEC QL NAA+PROBE: SIGNIFICANT CHANGE UP
SODIUM SERPL-SCNC: 134 MMOL/L — LOW (ref 135–145)
SODIUM SERPL-SCNC: 134 MMOL/L — LOW (ref 135–145)
SODIUM SERPL-SCNC: 136 MMOL/L — SIGNIFICANT CHANGE UP (ref 135–145)
SODIUM SERPL-SCNC: 136 MMOL/L — SIGNIFICANT CHANGE UP (ref 135–145)
SODIUM SERPL-SCNC: 139 MMOL/L — SIGNIFICANT CHANGE UP (ref 135–145)
SPECIMEN SOURCE: SIGNIFICANT CHANGE UP
SPECIMEN SOURCE: SIGNIFICANT CHANGE UP
TROPONIN I SERPL-MCNC: 0.13 NG/ML — HIGH (ref 0.01–0.04)
TROPONIN I SERPL-MCNC: 2.18 NG/ML — HIGH (ref 0.01–0.04)
TROPONIN I SERPL-MCNC: 5.94 NG/ML — HIGH (ref 0.01–0.04)
TROPONIN I SERPL-MCNC: 6.77 NG/ML — HIGH (ref 0.01–0.04)
TROPONIN I SERPL-MCNC: 8.29 NG/ML — HIGH (ref 0.01–0.04)
VANCOMYCIN TROUGH SERPL-MCNC: 12.9 UG/ML — SIGNIFICANT CHANGE UP (ref 10–20)
WBC # BLD: 10.2 K/UL — SIGNIFICANT CHANGE UP (ref 3.8–10.5)
WBC # BLD: 15.72 K/UL — HIGH (ref 3.8–10.5)
WBC # BLD: 4.51 K/UL — SIGNIFICANT CHANGE UP (ref 3.8–10.5)
WBC # BLD: 5.4 K/UL — SIGNIFICANT CHANGE UP (ref 3.8–10.5)
WBC # BLD: 8.08 K/UL — SIGNIFICANT CHANGE UP (ref 3.8–10.5)
WBC # FLD AUTO: 10.2 K/UL — SIGNIFICANT CHANGE UP (ref 3.8–10.5)
WBC # FLD AUTO: 15.72 K/UL — HIGH (ref 3.8–10.5)
WBC # FLD AUTO: 4.51 K/UL — SIGNIFICANT CHANGE UP (ref 3.8–10.5)
WBC # FLD AUTO: 5.4 K/UL — SIGNIFICANT CHANGE UP (ref 3.8–10.5)
WBC # FLD AUTO: 8.08 K/UL — SIGNIFICANT CHANGE UP (ref 3.8–10.5)

## 2020-01-01 PROCEDURE — 74177 CT ABD & PELVIS W/CONTRAST: CPT | Mod: 26

## 2020-01-01 PROCEDURE — 71045 X-RAY EXAM CHEST 1 VIEW: CPT | Mod: 26

## 2020-01-01 PROCEDURE — 99233 SBSQ HOSP IP/OBS HIGH 50: CPT

## 2020-01-01 PROCEDURE — 93306 TTE W/DOPPLER COMPLETE: CPT | Mod: 26

## 2020-01-01 PROCEDURE — 70450 CT HEAD/BRAIN W/O DYE: CPT | Mod: 26

## 2020-01-01 PROCEDURE — 93010 ELECTROCARDIOGRAM REPORT: CPT

## 2020-01-01 PROCEDURE — 92950 HEART/LUNG RESUSCITATION CPR: CPT

## 2020-01-01 PROCEDURE — 71260 CT THORAX DX C+: CPT | Mod: 26

## 2020-01-01 PROCEDURE — 99285 EMERGENCY DEPT VISIT HI MDM: CPT | Mod: CS,25

## 2020-01-01 PROCEDURE — 99291 CRITICAL CARE FIRST HOUR: CPT

## 2020-01-01 PROCEDURE — 73718 MRI LOWER EXTREMITY W/O DYE: CPT | Mod: 26,RT

## 2020-01-01 RX ORDER — POTASSIUM CHLORIDE 20 MEQ
20 PACKET (EA) ORAL
Refills: 0 | Status: COMPLETED | OUTPATIENT
Start: 2020-01-01 | End: 2020-01-01

## 2020-01-01 RX ORDER — GLUCAGON INJECTION, SOLUTION 0.5 MG/.1ML
1 INJECTION, SOLUTION SUBCUTANEOUS ONCE
Refills: 0 | Status: DISCONTINUED | OUTPATIENT
Start: 2020-01-01 | End: 2020-01-01

## 2020-01-01 RX ORDER — SODIUM CHLORIDE 9 MG/ML
1000 INJECTION, SOLUTION INTRAVENOUS
Refills: 0 | Status: DISCONTINUED | OUTPATIENT
Start: 2020-01-01 | End: 2020-01-01

## 2020-01-01 RX ORDER — PHYTONADIONE (VIT K1) 5 MG
5 TABLET ORAL ONCE
Refills: 0 | Status: COMPLETED | OUTPATIENT
Start: 2020-01-01 | End: 2020-01-01

## 2020-01-01 RX ORDER — DEXTROSE 50 % IN WATER 50 %
50 SYRINGE (ML) INTRAVENOUS
Refills: 0 | Status: DISCONTINUED | OUTPATIENT
Start: 2020-01-01 | End: 2020-01-01

## 2020-01-01 RX ORDER — HEPARIN SODIUM 5000 [USP'U]/ML
5000 INJECTION INTRAVENOUS; SUBCUTANEOUS EVERY 12 HOURS
Refills: 0 | Status: DISCONTINUED | OUTPATIENT
Start: 2020-01-01 | End: 2020-01-01

## 2020-01-01 RX ORDER — PIPERACILLIN AND TAZOBACTAM 4; .5 G/20ML; G/20ML
3.38 INJECTION, POWDER, LYOPHILIZED, FOR SOLUTION INTRAVENOUS EVERY 12 HOURS
Refills: 0 | Status: DISCONTINUED | OUTPATIENT
Start: 2020-01-01 | End: 2020-01-01

## 2020-01-01 RX ORDER — DEXTROSE 50 % IN WATER 50 %
25 SYRINGE (ML) INTRAVENOUS ONCE
Refills: 0 | Status: COMPLETED | OUTPATIENT
Start: 2020-01-01 | End: 2020-01-01

## 2020-01-01 RX ORDER — LEVETIRACETAM 250 MG/1
TABLET, FILM COATED ORAL
Refills: 0 | Status: DISCONTINUED | OUTPATIENT
Start: 2020-01-01 | End: 2020-01-01

## 2020-01-01 RX ORDER — INSULIN GLARGINE 100 [IU]/ML
10 INJECTION, SOLUTION SUBCUTANEOUS EVERY MORNING
Refills: 0 | Status: DISCONTINUED | OUTPATIENT
Start: 2020-01-01 | End: 2020-01-01

## 2020-01-01 RX ORDER — HEPARIN SODIUM 5000 [USP'U]/ML
INJECTION INTRAVENOUS; SUBCUTANEOUS
Qty: 25000 | Refills: 0 | Status: DISCONTINUED | OUTPATIENT
Start: 2020-01-01 | End: 2020-01-01

## 2020-01-01 RX ORDER — CHLORHEXIDINE GLUCONATE 213 G/1000ML
1 SOLUTION TOPICAL
Refills: 0 | Status: DISCONTINUED | OUTPATIENT
Start: 2020-01-01 | End: 2020-01-01

## 2020-01-01 RX ORDER — PROPOFOL 10 MG/ML
5 INJECTION, EMULSION INTRAVENOUS
Qty: 1000 | Refills: 0 | Status: DISCONTINUED | OUTPATIENT
Start: 2020-01-01 | End: 2020-01-01

## 2020-01-01 RX ORDER — PIPERACILLIN AND TAZOBACTAM 4; .5 G/20ML; G/20ML
3.38 INJECTION, POWDER, LYOPHILIZED, FOR SOLUTION INTRAVENOUS ONCE
Refills: 0 | Status: COMPLETED | OUTPATIENT
Start: 2020-01-01 | End: 2020-01-01

## 2020-01-01 RX ORDER — CEFTRIAXONE 500 MG/1
1000 INJECTION, POWDER, FOR SOLUTION INTRAMUSCULAR; INTRAVENOUS ONCE
Refills: 0 | Status: COMPLETED | OUTPATIENT
Start: 2020-01-01 | End: 2020-01-01

## 2020-01-01 RX ORDER — NOREPINEPHRINE BITARTRATE/D5W 8 MG/250ML
0.05 PLASTIC BAG, INJECTION (ML) INTRAVENOUS
Qty: 16 | Refills: 0 | Status: DISCONTINUED | OUTPATIENT
Start: 2020-01-01 | End: 2020-01-01

## 2020-01-01 RX ORDER — INSULIN LISPRO 100/ML
VIAL (ML) SUBCUTANEOUS EVERY 4 HOURS
Refills: 0 | Status: DISCONTINUED | OUTPATIENT
Start: 2020-01-01 | End: 2020-01-01

## 2020-01-01 RX ORDER — NOREPINEPHRINE BITARTRATE/D5W 8 MG/250ML
0.05 PLASTIC BAG, INJECTION (ML) INTRAVENOUS
Qty: 8 | Refills: 0 | Status: DISCONTINUED | OUTPATIENT
Start: 2020-01-01 | End: 2020-01-01

## 2020-01-01 RX ORDER — METRONIDAZOLE 500 MG
500 TABLET ORAL ONCE
Refills: 0 | Status: COMPLETED | OUTPATIENT
Start: 2020-01-01 | End: 2020-01-01

## 2020-01-01 RX ORDER — PANTOPRAZOLE SODIUM 20 MG/1
40 TABLET, DELAYED RELEASE ORAL DAILY
Refills: 0 | Status: DISCONTINUED | OUTPATIENT
Start: 2020-01-01 | End: 2020-01-01

## 2020-01-01 RX ORDER — INSULIN HUMAN 100 [IU]/ML
6 INJECTION, SOLUTION SUBCUTANEOUS
Qty: 100 | Refills: 0 | Status: DISCONTINUED | OUTPATIENT
Start: 2020-01-01 | End: 2020-01-01

## 2020-01-01 RX ORDER — CLOPIDOGREL BISULFATE 75 MG/1
75 TABLET, FILM COATED ORAL DAILY
Refills: 0 | Status: DISCONTINUED | OUTPATIENT
Start: 2020-01-01 | End: 2020-01-01

## 2020-01-01 RX ORDER — ASPIRIN/CALCIUM CARB/MAGNESIUM 324 MG
81 TABLET ORAL DAILY
Refills: 0 | Status: DISCONTINUED | OUTPATIENT
Start: 2020-01-01 | End: 2020-01-01

## 2020-01-01 RX ORDER — INSULIN GLARGINE 100 [IU]/ML
10 INJECTION, SOLUTION SUBCUTANEOUS ONCE
Refills: 0 | Status: COMPLETED | OUTPATIENT
Start: 2020-01-01 | End: 2020-01-01

## 2020-01-01 RX ORDER — MORPHINE SULFATE 50 MG/1
2 CAPSULE, EXTENDED RELEASE ORAL EVERY 4 HOURS
Refills: 0 | Status: DISCONTINUED | OUTPATIENT
Start: 2020-01-01 | End: 2020-01-01

## 2020-01-01 RX ORDER — LEVETIRACETAM 250 MG/1
500 TABLET, FILM COATED ORAL EVERY 12 HOURS
Refills: 0 | Status: DISCONTINUED | OUTPATIENT
Start: 2020-01-01 | End: 2020-01-01

## 2020-01-01 RX ORDER — METRONIDAZOLE 500 MG
500 TABLET ORAL EVERY 8 HOURS
Refills: 0 | Status: DISCONTINUED | OUTPATIENT
Start: 2020-01-01 | End: 2020-01-01

## 2020-01-01 RX ORDER — VANCOMYCIN HCL 1 G
1000 VIAL (EA) INTRAVENOUS ONCE
Refills: 0 | Status: COMPLETED | OUTPATIENT
Start: 2020-01-01 | End: 2020-01-01

## 2020-01-01 RX ORDER — CHLORHEXIDINE GLUCONATE 213 G/1000ML
15 SOLUTION TOPICAL EVERY 12 HOURS
Refills: 0 | Status: DISCONTINUED | OUTPATIENT
Start: 2020-01-01 | End: 2020-01-01

## 2020-01-01 RX ORDER — DEXTROSE 50 % IN WATER 50 %
15 SYRINGE (ML) INTRAVENOUS ONCE
Refills: 0 | Status: DISCONTINUED | OUTPATIENT
Start: 2020-01-01 | End: 2020-01-01

## 2020-01-01 RX ORDER — LEVETIRACETAM 250 MG/1
1000 TABLET, FILM COATED ORAL ONCE
Refills: 0 | Status: COMPLETED | OUTPATIENT
Start: 2020-01-01 | End: 2020-01-01

## 2020-01-01 RX ORDER — DEXTROSE 50 % IN WATER 50 %
25 SYRINGE (ML) INTRAVENOUS
Refills: 0 | Status: DISCONTINUED | OUTPATIENT
Start: 2020-01-01 | End: 2020-01-01

## 2020-01-01 RX ADMIN — LEVETIRACETAM 420 MILLIGRAM(S): 250 TABLET, FILM COATED ORAL at 10:13

## 2020-01-01 RX ADMIN — Medication 2: at 06:42

## 2020-01-01 RX ADMIN — HEPARIN SODIUM 5000 UNIT(S): 5000 INJECTION INTRAVENOUS; SUBCUTANEOUS at 05:53

## 2020-01-01 RX ADMIN — INSULIN GLARGINE 10 UNIT(S): 100 INJECTION, SOLUTION SUBCUTANEOUS at 09:00

## 2020-01-01 RX ADMIN — Medication 4: at 05:24

## 2020-01-01 RX ADMIN — Medication 1 APPLICATION(S): at 12:24

## 2020-01-01 RX ADMIN — CHLORHEXIDINE GLUCONATE 15 MILLILITER(S): 213 SOLUTION TOPICAL at 05:24

## 2020-01-01 RX ADMIN — HEPARIN SODIUM 800 UNIT(S)/HR: 5000 INJECTION INTRAVENOUS; SUBCUTANEOUS at 10:41

## 2020-01-01 RX ADMIN — HEPARIN SODIUM 850 UNIT(S)/HR: 5000 INJECTION INTRAVENOUS; SUBCUTANEOUS at 07:58

## 2020-01-01 RX ADMIN — Medication 2: at 01:14

## 2020-01-01 RX ADMIN — SODIUM CHLORIDE 75 MILLILITER(S): 9 INJECTION, SOLUTION INTRAVENOUS at 22:44

## 2020-01-01 RX ADMIN — INSULIN GLARGINE 10 UNIT(S): 100 INJECTION, SOLUTION SUBCUTANEOUS at 15:14

## 2020-01-01 RX ADMIN — CHLORHEXIDINE GLUCONATE 15 MILLILITER(S): 213 SOLUTION TOPICAL at 05:54

## 2020-01-01 RX ADMIN — Medication 81 MILLIGRAM(S): at 12:18

## 2020-01-01 RX ADMIN — MORPHINE SULFATE 2 MILLIGRAM(S): 50 CAPSULE, EXTENDED RELEASE ORAL at 13:01

## 2020-01-01 RX ADMIN — LEVETIRACETAM 420 MILLIGRAM(S): 250 TABLET, FILM COATED ORAL at 21:32

## 2020-01-01 RX ADMIN — CHLORHEXIDINE GLUCONATE 15 MILLILITER(S): 213 SOLUTION TOPICAL at 05:55

## 2020-01-01 RX ADMIN — CHLORHEXIDINE GLUCONATE 1 APPLICATION(S): 213 SOLUTION TOPICAL at 05:54

## 2020-01-01 RX ADMIN — CLOPIDOGREL BISULFATE 75 MILLIGRAM(S): 75 TABLET, FILM COATED ORAL at 11:28

## 2020-01-01 RX ADMIN — Medication 250 MILLIGRAM(S): at 05:24

## 2020-01-01 RX ADMIN — HEPARIN SODIUM 0 UNIT(S)/HR: 5000 INJECTION INTRAVENOUS; SUBCUTANEOUS at 00:57

## 2020-01-01 RX ADMIN — Medication 2: at 10:15

## 2020-01-01 RX ADMIN — PIPERACILLIN AND TAZOBACTAM 25 GRAM(S): 4; .5 INJECTION, POWDER, LYOPHILIZED, FOR SOLUTION INTRAVENOUS at 22:38

## 2020-01-01 RX ADMIN — Medication 3.19 MICROGRAM(S)/KG/MIN: at 08:14

## 2020-01-01 RX ADMIN — INSULIN HUMAN 6 UNIT(S)/HR: 100 INJECTION, SOLUTION SUBCUTANEOUS at 02:22

## 2020-01-01 RX ADMIN — Medication 1 APPLICATION(S): at 11:29

## 2020-01-01 RX ADMIN — SODIUM CHLORIDE 75 MILLILITER(S): 9 INJECTION, SOLUTION INTRAVENOUS at 11:29

## 2020-01-01 RX ADMIN — HEPARIN SODIUM 700 UNIT(S)/HR: 5000 INJECTION INTRAVENOUS; SUBCUTANEOUS at 18:22

## 2020-01-01 RX ADMIN — CHLORHEXIDINE GLUCONATE 1 APPLICATION(S): 213 SOLUTION TOPICAL at 05:30

## 2020-01-01 RX ADMIN — HEPARIN SODIUM 750 UNIT(S)/HR: 5000 INJECTION INTRAVENOUS; SUBCUTANEOUS at 17:45

## 2020-01-01 RX ADMIN — LEVETIRACETAM 420 MILLIGRAM(S): 250 TABLET, FILM COATED ORAL at 22:38

## 2020-01-01 RX ADMIN — Medication 1 APPLICATION(S): at 23:49

## 2020-01-01 RX ADMIN — Medication 1 APPLICATION(S): at 17:03

## 2020-01-01 RX ADMIN — CEFTRIAXONE 100 MILLIGRAM(S): 500 INJECTION, POWDER, FOR SOLUTION INTRAMUSCULAR; INTRAVENOUS at 00:47

## 2020-01-01 RX ADMIN — PIPERACILLIN AND TAZOBACTAM 25 GRAM(S): 4; .5 INJECTION, POWDER, LYOPHILIZED, FOR SOLUTION INTRAVENOUS at 10:13

## 2020-01-01 RX ADMIN — CLOPIDOGREL BISULFATE 75 MILLIGRAM(S): 75 TABLET, FILM COATED ORAL at 11:56

## 2020-01-01 RX ADMIN — PIPERACILLIN AND TAZOBACTAM 200 GRAM(S): 4; .5 INJECTION, POWDER, LYOPHILIZED, FOR SOLUTION INTRAVENOUS at 10:51

## 2020-01-01 RX ADMIN — HEPARIN SODIUM 700 UNIT(S)/HR: 5000 INJECTION INTRAVENOUS; SUBCUTANEOUS at 01:15

## 2020-01-01 RX ADMIN — PANTOPRAZOLE SODIUM 40 MILLIGRAM(S): 20 TABLET, DELAYED RELEASE ORAL at 11:56

## 2020-01-01 RX ADMIN — LEVETIRACETAM 420 MILLIGRAM(S): 250 TABLET, FILM COATED ORAL at 09:00

## 2020-01-01 RX ADMIN — CHLORHEXIDINE GLUCONATE 15 MILLILITER(S): 213 SOLUTION TOPICAL at 17:45

## 2020-01-01 RX ADMIN — Medication 250 MILLIGRAM(S): at 12:10

## 2020-01-01 RX ADMIN — PROPOFOL 2.04 MICROGRAM(S)/KG/MIN: 10 INJECTION, EMULSION INTRAVENOUS at 02:30

## 2020-01-01 RX ADMIN — HEPARIN SODIUM 550 UNIT(S)/HR: 5000 INJECTION INTRAVENOUS; SUBCUTANEOUS at 01:59

## 2020-01-01 RX ADMIN — Medication 81 MILLIGRAM(S): at 11:29

## 2020-01-01 RX ADMIN — Medication 6.38 MICROGRAM(S)/KG/MIN: at 02:29

## 2020-01-01 RX ADMIN — Medication 4: at 09:11

## 2020-01-01 RX ADMIN — Medication 100 MILLIGRAM(S): at 00:48

## 2020-01-01 RX ADMIN — Medication 101 MILLIGRAM(S): at 02:30

## 2020-01-01 RX ADMIN — Medication 25 MILLILITER(S): at 17:02

## 2020-01-01 RX ADMIN — LEVETIRACETAM 400 MILLIGRAM(S): 250 TABLET, FILM COATED ORAL at 10:41

## 2020-01-01 RX ADMIN — Medication 100 MILLIEQUIVALENT(S): at 06:09

## 2020-01-01 RX ADMIN — Medication 100 MILLIEQUIVALENT(S): at 04:11

## 2020-01-01 RX ADMIN — Medication 1 APPLICATION(S): at 05:55

## 2020-01-01 RX ADMIN — HEPARIN SODIUM 550 UNIT(S)/HR: 5000 INJECTION INTRAVENOUS; SUBCUTANEOUS at 09:05

## 2020-01-01 RX ADMIN — PIPERACILLIN AND TAZOBACTAM 25 GRAM(S): 4; .5 INJECTION, POWDER, LYOPHILIZED, FOR SOLUTION INTRAVENOUS at 09:00

## 2020-01-01 RX ADMIN — CHLORHEXIDINE GLUCONATE 15 MILLILITER(S): 213 SOLUTION TOPICAL at 17:03

## 2020-01-01 RX ADMIN — PIPERACILLIN AND TAZOBACTAM 25 GRAM(S): 4; .5 INJECTION, POWDER, LYOPHILIZED, FOR SOLUTION INTRAVENOUS at 21:32

## 2020-01-01 RX ADMIN — PANTOPRAZOLE SODIUM 40 MILLIGRAM(S): 20 TABLET, DELAYED RELEASE ORAL at 12:19

## 2020-01-01 RX ADMIN — Medication 81 MILLIGRAM(S): at 11:56

## 2020-01-01 RX ADMIN — PANTOPRAZOLE SODIUM 40 MILLIGRAM(S): 20 TABLET, DELAYED RELEASE ORAL at 11:28

## 2020-01-01 RX ADMIN — CLOPIDOGREL BISULFATE 75 MILLIGRAM(S): 75 TABLET, FILM COATED ORAL at 12:18

## 2020-01-01 RX ADMIN — Medication 100 MILLIGRAM(S): at 08:15

## 2020-07-02 NOTE — ED ADULT TRIAGE NOTE - CHIEF COMPLAINT QUOTE
pt went to cardiac arrest while eating around 1018, witness arrest ACLS in progress , intubated in the field ETT7.5 ett , lip line 26 cm, 5 epi and one bicar given

## 2020-07-03 NOTE — H&P ADULT - ATTENDING COMMENTS
73 y/o M w/ESRD on HD, CAD s/p CABG, DM admitted post cardiac arrest. Prolonged downtime. Likely anoxic brain injury. Seizures vs myoclonus. Acute respiratory failure w/hypoxia and hypercapnia. Likely NSTEMI. Hypotension likely multifactorial.    - Targeted temperature management  - EEG to r/o status  - Keppra for possible seizures/myoclonus  - Continue mechanical ventilation  - Pressor support as needed goal MAP >= 65  - Heparin gtt, ASA, plavix, statin  - HD as per renal  - Insulin for hyperglycemia  - Empiric broad spectrum abx  - GOC discussions, poor prognosis    I have personally provided 40 minutes of attending critical care time excluding procedures independent of PA critical care time.

## 2020-07-03 NOTE — H&P ADULT - NSICDXPASTMEDICALHX_GEN_ALL_CORE_FT
PAST MEDICAL HISTORY:  CAD (Coronary Artery Disease)     Diabetes Mellitus Type 2 in Nonobese     HTN (Hypertension), Benign     PAD (Peripheral Artery Disease) s/p bilat iliac stents 2006    Renal Insufficiency

## 2020-07-03 NOTE — ED PROVIDER NOTE - OBJECTIVE STATEMENT
74 year old man with past medical history of HTN, DM, ESRD on HD (MWF), and CAD s/p CABG. Patient had no recent illness. Went to PMD yesterday for an appointment and told everything was fine. Patient was eating dinner, subsequently witnessed to vomit. shortly thereafter collaped. no cpr at home. EMS arrival in 5 minutes. 5 epis administerd in field, intubated with etCo2 readings ~60. dialayed full session on wednesday 74 year old man with past medical history of HTN, DM, ESRD on HD (MWF), and CAD s/p CABG. Patient had no recent illness. Went to PMD yesterday for an appointment and told everything was fine. Patient was eating dinner, subsequently witnessed to vomit. shortly thereafter collaped. no cpr at home. EMS arrival in 5 minutes. 5 epis administered in field, intubated with etCo2 readings ~60. dialyzed full session on wednesday. intubated in field by EMS with 7.5 ETT

## 2020-07-03 NOTE — CONSULT NOTE ADULT - SUBJECTIVE AND OBJECTIVE BOX
all history obtained form EMR and endorsed by in-house critical care PA    75 yo M with HTN, DM, ESRD on HD last HD last wed presents from home brought by EMS following cardiac arrest.  Apparently experienced weakness followed by syncope and unresponsiveness  Downtime apparently 5 min before EMS arrived  CPR commenced; following 40min of CPR, ROSC was achieved  patient was intubated and transferred to ER    of note, I find no prior chart in the system for the patient beside this admission, unable to evaluate some labs as old or new    In ER: pupils fixed and unresponsive    Labs notable for:  trop 0.123  hgb 9.9  plt 135  INR 2.05  Cr 5.6  BUN 51  Gluc 498 on chem8  alb 2.7  bnp >175,000  abg on initial vent 7.12/75/125 on 100% FiO2; serum bicarb 23    EKG: endorsed wide, awaiting original imaging for review all history obtained form EMR and endorsed by in-house critical care PA    75 yo M with HTN, DM, ESRD on HD last HD last wed presents from home brought by EMS following cardiac arrest.  Apparently experienced weakness followed by syncope and unresponsiveness  Downtime apparently 5 min before EMS arrived  CPR commenced; following 40min of CPR, ROSC was achieved  patient was intubated and transferred to ER    of note, I find no prior chart in the system for the patient beside this admission, unable to evaluate some labs as old or new    In ER: pupils fixed and unresponsive    Physical exam endrosed to me:  pupils fixed, equal unresponsive  chest s/p prior open heart surgery      Labs notable for:  trop 0.123  hgb 9.9  plt 135  INR 2.05  Cr 5.6  BUN 51  Gluc 498 on chem8  alb 2.7  bnp >175,000  abg on initial vent 7.12/75/125 on 100% FiO2; serum bicarb 23    EKG: endorsed wide, awaiting original imaging for review  reviewed 2am sinus, HR 57, LBBB, singiifcant ST depressions V4-V6; no ST elevations; anterior leads don't exceed Sgarbossa criteria.    CTH: reviewed personally: I don't see ICH    CT Chest:   large pleural effusion R side without ascites around liver or significant ascites; effusion causing severe atelectasis of the R lung  ET tube in good position  reports of food in the airway endorsed to me is unimpressive    Impression:  75 yo M with prior cardiac history, ESRD on HD s/p cardiac arrest with ROSC, unresponsive  etiology of cardiac arrest still not entirely clear  flash effusion R chest with resulting severe atelectasis could have contributed... or is a consequence of CPR  EKG endorsed without significant changes, trop level c/w demand level - unlikely MI  onset of DKA is possible as pt now has combined respiratory and metabolic acidosis, gluc 500, h/o DM; bicarb low-normal but could have been chronically elevated (no prior labs)  R effusion certainly contributes to acute hypoxic and hypercapnic resp failure, will need to undergo diagnostic thoracentesis when supratherapeutic INR cnormalized and prior to any exutbation attempt; possibility of R effusion being traumatic, possibly hemorrhagic is not excluded; however thoracentesis will have be delayed until it is safe. At the time consideration given that patient is effectively ventilating with a single lung  ROSC without mental status mandates hypothermia protocol  glucose needs aggressive control per DKA protocol       PLAN  patient with life in imminet danger  admit to MICU  cont mechanical ventilation, currently set at 400x20 100% +5 after rate has been increased based on initial abg  no sedation at this time required  initiate hypothermia protocol; would not infuse cold saline at this time given potential intravascular volume overload  cont mechanical ventilation  reduce Vt to 350cc, RR 26 100 +5, wean FiO 2 as tolerated but noted is high O2 gradient  check ABG 1hr  bedside US by CC PA when available  formal TTE in am  Vit K now in preparation for thoracentesis in am  serial labs including CBC to r/u hemothorax   start insulin drip as per DKA protocol; check BHB; give 40K now for insulin infusion  call nephrology 7am tomorrow, suspect will need HD with volume removal and 4K bath  not opposed to continuing ceftriaxone/flagyl for suspected aspiration  Repeat ECG now and trend trop/ECG    plan of care d/w critical care PA who is currently bedside all history obtained form EMR and endorsed by in-house critical care PA    75 yo M with HTN, DM, ESRD on HD last HD last wed presents from home brought by EMS following cardiac arrest.  Apparently experienced weakness followed by syncope and unresponsiveness  Downtime apparently 5 min before EMS arrived  CPR commenced; following 40min of CPR, ROSC was achieved  patient was intubated and transferred to ER    of note, I find no prior chart in the system for the patient beside this admission, unable to evaluate some labs as old or new    In ER: pupils fixed and unresponsive    Physical exam endrosed to me:  pupils fixed, equal unresponsive  chest s/p prior open heart surgery      Labs notable for:  trop 0.123  hgb 9.9  plt 135  INR 2.05  Cr 5.6  BUN 51  Gluc 498 on chem8  alb 2.7  bnp >175,000  abg on initial vent 7.12/75/125 on 100% FiO2; serum bicarb 23    EKG: endorsed wide, awaiting original imaging for review  reviewed 2am sinus, HR 57, LBBB, singiifcant ST depressions V4-V6; no ST elevations; anterior leads don't exceed Sgarbossa criteria.    CTH: reviewed personally: I don't see ICH    CT Chest:   large pleural effusion R side without ascites around liver or significant ascites; effusion causing severe atelectasis of the R lung  ET tube in good position  reports of food in the airway endorsed to me is unimpressive    Impression:  75 yo M with prior cardiac history, ESRD on HD s/p cardiac arrest with ROSC, unresponsive  etiology of cardiac arrest still not entirely clear  flash effusion R chest with resulting severe atelectasis could have contributed... or is a consequence of CPR  EKG endorsed without significant changes, trop level c/w demand level - unlikely MI  onset of DKA is possible as pt now has combined respiratory and metabolic acidosis, gluc 500, h/o DM; bicarb low-normal but could have been chronically elevated (no prior labs)  R effusion certainly contributes to acute hypoxic and hypercapnic resp failure, will need to undergo diagnostic thoracentesis when supratherapeutic INR cnormalized and prior to any exutbation attempt; possibility of R effusion being traumatic, possibly hemorrhagic is not excluded; however thoracentesis will have be delayed until it is safe. At the time consideration given that patient is effectively ventilating with a single lung  ROSC without mental status mandates hypothermia protocol  glucose needs aggressive control per DKA protocol       PLAN  patient with life in imminet danger  admit to MICU  cont mechanical ventilation, currently set at 400x20 100% +5 after rate has been increased based on initial abg  no sedation at this time required  initiate hypothermia protocol; would not infuse cold saline at this time given potential intravascular volume overload  cont mechanical ventilation  reduce Vt to 350cc, RR 26 100 +5, wean FiO 2 as tolerated but noted is high O2 gradient  check ABG 1hr  bedside US by CC PA when available  formal TTE in am  Vit K now in preparation for thoracentesis in am  serial labs including CBC to r/u hemothorax   start insulin drip as per DKA protocol; check BHB; give 40K now for insulin infusion  call nephrology 7am tomorrow, suspect will need HD with volume removal and 4K bath  not opposed to continuing ceftriaxone/flagyl for suspected aspiration  Repeat ECG now and trend trop/ECG    plan of care d/w critical care PA who is currently bedside      Addendum:  ABG and VBG obtained, Jun's equation used to approximate patietn's cardiac output, which is:  CO 1.6L/min, CI 1.3L/min/m2, Stroke volume 27ml/beat    advise cardiology consultation  call nephrology for dialysis and volume removal  consider adding dobutamine  lactic acid should be repeated

## 2020-07-03 NOTE — H&P ADULT - NSHPLABSRESULTS_GEN_ALL_CORE
Lab Results:  CBC  CBC Full  -  ( 02 Jul 2020 23:57 )  WBC Count : 5.40 K/uL  RBC Count : 2.79 M/uL  Hemoglobin : 9.9 g/dL  Hematocrit : 31.9 %  Platelet Count - Automated : 135 K/uL  Mean Cell Volume : 114.3 fl  Mean Cell Hemoglobin : 35.5 pg  Mean Cell Hemoglobin Concentration : 31.0 gm/dL      .		Differential:	[] Automated		[] Manual  Chemistry                        9.9    5.40  )-----------( 135      ( 02 Jul 2020 23:57 )             31.9     07-02    134<L>  |  97  |  51<H>  ----------------------------<  498<HH>  4.2   |  23  |  5.60<H>    Ca    8.4<L>      02 Jul 2020 23:57  Mg     2.4     07-02    TPro  6.2  /  Alb  2.7<L>  /  TBili  0.6  /  DBili  x   /  AST  98<H>  /  ALT  61  /  AlkPhos  162<H>  07-02    LIVER FUNCTIONS - ( 02 Jul 2020 23:57 )  Alb: 2.7 g/dL / Pro: 6.2 gm/dL / ALK PHOS: 162 U/L / ALT: 61 U/L / AST: 98 U/L / GGT: x           PT/INR - ( 02 Jul 2020 23:57 )   PT: 22.0 sec;   INR: 2.05 ratio    PTT - ( 02 Jul 2020 23:57 )  PTT:28.8 sec    ABG - ( 02 Jul 2020 23:22 )  pH, Arterial: x     pH, Blood: 7.12  /  pCO2: 75    /  pO2: 125   / HCO3: 23    / Base Excess: -7.2  /  SaO2: 97       Troponin I, Serum: .134 ng/mL <H> (07-02-20 @ 23:57)

## 2020-07-03 NOTE — H&P ADULT - ASSESSMENT
Plan:     Neuro:  Neuro checks q 4 hour   Patient having twitching, started on propofol drip   Seizure precautions    CV:   Continue hemodynamic monitoring   Bedside POCUS and lung US  On Levophed, titrate to MAP > 65   Trend Lactate  Trend troponin  EKG   ECHO  Hypothermia post cardiac arrest per protocol.   Will hold off IV fluid for now.   Will obtain VBG.     Pulmonary:   Continue with mechanical ventilation.   Ventilator adjusted to optimize ventilation and oxygenation.  New Vent settings : 26/350/100%/+5  Not a candidate for weaning at this time.   Sputum cultures  Wean down FIO2 as tolerated   Chest PT and suctioning as needed   Aspiration precautions  HOB elevation 30 degrees  Will obtain ABG      GI:   GI prophylaxis with pantoprazole   NGT to intermittent low wall suction   NPO except meds    Endo:   Monitor fingersticks hourly   Starting insulin drip for elevated glucose.     Renal:   Warner in place in a critical ill pt for accurate urinary monitoring or due to immobility   Monitor I and O hourly closely   Monitor electrolytes  Correct electrolyte derangements ( will give potassium )  Renally dose medications  Call Nephrology in morning for dialysis.     ID:   Epimeric coverage for aspiration pna, on Ceftriaxone and Flagyl.    Follow up cultures  Will sent Procalcitonin.   F/u covid results.   Put on isolation until results are back     Heme:   Repeat labs with VBG, ABG.   Will give Vit K to correct INR so that a thoracentesis can be performed tomorrow.   DVT prophylaxis with SCD   No evidence of bleeding, will continue to monitor    Other/Disposition:  Patient will be admitted to the ICU.   Patient is critically ill   Prognosis guarded  Will ask SW to help located any family.   Code Status: Full code    Case [facility] with [facility]. [facility] ( [facility] ) 74 year old man with past medical history of HTN, DM, ESRD on HD (MWF), and CAD s/p CABG. In ICU for management of acute respiratory failure, s/p cardiac arrest, CKD on HD, fluid overload, pulmonary edema, right pleural effusion, demand ischemia, hyperglycemia, possible sepsis and acidosis.     Plan:     Neuro:  Neuro checks q 4 hour   Patient having twitching, started on propofol drip   Seizure precautions    CV:   Continue hemodynamic monitoring   Bedside POCUS and lung US  On Levophed, titrate to MAP > 65   Trend Lactate  Trend troponin  EKG   ECHO  Hypothermia post cardiac arrest per protocol.   Will hold off IV fluid for now.   Will obtain VBG.     Pulmonary:   Continue with mechanical ventilation.   Ventilator adjusted to optimize ventilation and oxygenation.  New Vent settings : 26/350/100%/+5  Not a candidate for weaning at this time.   Sputum cultures  Wean down FIO2 as tolerated   Chest PT and suctioning as needed   Aspiration precautions  HOB elevation 30 degrees  Will obtain ABG      GI:   GI prophylaxis with pantoprazole   NGT to intermittent low wall suction   NPO except meds    Endo:   Monitor fingersticks hourly   Starting insulin drip for elevated glucose.     Renal:   Warner in place in a critical ill pt for accurate urinary monitoring or due to immobility   Monitor I and O hourly closely   Monitor electrolytes  Correct electrolyte derangements ( will give potassium )  Renally dose medications  Call Nephrology in morning for dialysis.     ID:   Epimeric coverage for aspiration pna, on Ceftriaxone and Flagyl.    Follow up cultures  Will sent Procalcitonin.   F/u covid results.   Put on isolation until results are back     Heme:   Repeat labs with VBG, ABG.   Will give Vit K to correct INR so that a thoracentesis can be performed tomorrow.   DVT prophylaxis with SCD   No evidence of bleeding, will continue to monitor    Other/Disposition:  Patient will be admitted to the ICU.   Patient is critically ill   Prognosis guarded  Will ask SW to help located any family.   Code Status: Full code    Case discussed with Dr. Harris ( [facility] ) 74 year old man with past medical history of HTN, DM, ESRD on HD (MWF), and CAD s/p CABG. In ICU for management of acute respiratory failure, s/p cardiac arrest, CKD on HD, fluid overload, pulmonary edema, right pleural effusion, aspiration pneumonia, hyperglycemia, elevated troponin, possible sepsis and acidosis.     Plan:     Neuro:  Neuro checks q 4 hour   Patient having twitching, started on propofol drip   Seizure precautions    CV:   Continue hemodynamic monitoring   Bedside POCUS and lung US  On Levophed, titrate to MAP > 65   Trend Lactate  Trend troponin  EKG   ECHO  Hypothermia post cardiac arrest per protocol.   Will hold off IV fluid for now.   Will obtain VBG.     Pulmonary:   Continue with mechanical ventilation.   Ventilator adjusted to optimize ventilation and oxygenation.  New Vent settings : 26/350/100%/+5  Not a candidate for weaning at this time.   Sputum cultures  Wean down FIO2 as tolerated   Chest PT and suctioning as needed   Aspiration precautions  HOB elevation 30 degrees  Will obtain ABG      GI:   GI prophylaxis with pantoprazole   NGT to intermittent low wall suction   NPO except meds    Endo:   Monitor fingersticks hourly   Starting insulin drip for elevated glucose.     Renal:   Warner in place in a critical ill pt for accurate urinary monitoring or due to immobility   Monitor I and O hourly closely   Monitor electrolytes  Correct electrolyte derangements ( will give potassium )  Renally dose medications  Call Nephrology in morning for dialysis.     ID:   Epimeric coverage for aspiration pna, on Ceftriaxone and Flagyl.    Follow up cultures  Will sent Procalcitonin.   F/u covid results.   Put on isolation until results are back     Heme:   Repeat labs with VBG, ABG.   Will give Vit K to correct INR so that a thoracentesis can be performed tomorrow.   DVT prophylaxis with SCD   No evidence of bleeding, will continue to monitor    Other/Disposition:  Patient will be admitted to the ICU.   Patient is critically ill   Prognosis guarded  Will ask SW to help located any family.   Code Status: Full code    Case discussed with Dr. Harris ( [facility] )

## 2020-07-03 NOTE — CONSULT NOTE ADULT - SUBJECTIVE AND OBJECTIVE BOX
HPI:  Patient is a 74y old  Male s/p cardiac arrest, prolonged resuscitation ( 40min estimated ). In CCU, vented, unresponsive. FiO2 40%. On Levophed for BP support.   ESRD, receives dialysis in [facility] on MWF. Diffuse vasculopath, diabetic with numerous complications.  Poor compliance with FR of late while RRFx has been declining.       PAST MEDICAL & SURGICAL HISTORY:  PAD (Peripheral Artery Disease): s/p bilat iliac stents 2006  Renal Insufficiency  Diabetes Mellitus Type 2 in Nonobese  HTN (Hypertension), Benign  CAD (Coronary Artery Disease)      Social Hx: not a smoker    FAMILY HISTORY: N/A      Allergies    No Known Allergies            MEDICATIONS  (STANDING):  aspirin  chewable 81 milliGRAM(s) Oral daily  chlorhexidine 0.12% Liquid 15 milliLiter(s) Oral Mucosa every 12 hours  chlorhexidine 4% Liquid 1 Application(s) Topical <User Schedule>  clopidogrel Tablet 75 milliGRAM(s) Oral daily  dextrose 50% Injectable 50 milliLiter(s) IV Push every 15 minutes  dextrose 50% Injectable 25 milliLiter(s) IV Push every 15 minutes  heparin  Infusion.  Unit(s)/Hr (8 mL/Hr) IV Continuous <Continuous>  insulin regular Infusion 6 Unit(s)/Hr (6 mL/Hr) IV Continuous <Continuous>  levETIRAcetam  IVPB 500 milliGRAM(s) IV Intermittent every 12 hours  levETIRAcetam  IVPB      norepinephrine Infusion 0.05 MICROgram(s)/kG/Min (3.19 mL/Hr) IV Continuous <Continuous>  pantoprazole  Injectable 40 milliGRAM(s) IV Push daily  piperacillin/tazobactam IVPB. 3.375 Gram(s) IV Intermittent once  piperacillin/tazobactam IVPB.. 3.375 Gram(s) IV Intermittent every 12 hours  propofol Infusion 5 MICROgram(s)/kG/Min (2.04 mL/Hr) IV Continuous <Continuous>  vancomycin  IVPB 1000 milliGRAM(s) IV Intermittent once    MEDICATIONS  (PRN):      Daily Height in cm: 167.64 (02 Jul 2020 22:53)    Daily     Drug Dosing Weight  Height (cm): 167.64 (02 Jul 2020 22:53)  Weight (kg): 68 (02 Jul 2020 22:53)  BMI (kg/m2): 24.2 (02 Jul 2020 22:53)  BSA (m2): 1.77 (02 Jul 2020 22:53)      REVIEW OF SYSTEMS:    [facility] [place]          ABG - ( 03 Jul 2020 10:43 )  pH, Arterial: x     pH, Blood: 7.46  /  pCO2: 37    /  pO2: 79    / HCO3: 25    / Base Excess: 2.0   /  SaO2: 97                  I&O's Detail    02 Jul 2020 07:01  -  03 Jul 2020 07:00  --------------------------------------------------------  IN:    insulin regular Infusion: 54 mL    IV PiggyBack: 250 mL    norepinephrine Infusion: 114 mL    propofol Infusion: 61 mL  Total IN: 479 mL    OUT:  Total OUT: 0 mL    Total NET: 479 mL      03 Jul 2020 07:01  -  03 Jul 2020 10:48  --------------------------------------------------------  IN:    insulin regular Infusion: 10 mL    IV PiggyBack: 100 mL    norepinephrine Infusion: 19.5 mL    propofol Infusion: 24.4 mL  Total IN: 153.9 mL    OUT:  Total OUT: 0 mL    Total NET: 153.9 mL          07-02 @ 07:01  -  07-03 @ 07:00  --------------------------------------------------------  IN: 479 mL / OUT: 0 mL / NET: 479 mL    07-03 @ 07:01  -  07-03 @ 10:48  --------------------------------------------------------  IN: 153.9 mL / OUT: 0 mL / NET: 153.9 mL        PHYSICAL EXAM:    GENERAL: vented  NECK: Supple, No JVD, Normal thyroid  NERVOUS SYSTEM:  unresponsive   CHEST/LUNG: coarse BS  HEART: Regular rate and rhythm; No murmurs, rubs, or gallops  ABDOMEN: Soft, Nontender, Nondistended; Bowel sounds present  EXTREMITIES: pos edema      LABS:  CBC Full  -  ( 03 Jul 2020 03:01 )  WBC Count : 8.08 K/uL  RBC Count : 2.97 M/uL  Hemoglobin : 10.6 g/dL  Hematocrit : 31.6 %  Platelet Count - Automated : 181 K/uL  Mean Cell Volume : 106.4 fl  Mean Cell Hemoglobin : 35.7 pg  Mean Cell Hemoglobin Concentration : 33.5 gm/dL  Auto Neutrophil # : 7.59 K/uL  Auto Lymphocyte # : 0.22 K/uL  Auto Monocyte # : 0.21 K/uL  Auto Eosinophil # : 0.01 K/uL  Auto Basophil # : 0.01 K/uL  Auto Neutrophil % : 94.0 %  Auto Lymphocyte % : 2.7 %  Auto Monocyte % : 2.6 %  Auto Eosinophil % : 0.1 %  Auto Basophil % : 0.1 %    07-03    139  |  101  |  53<H>  ----------------------------<  299<H>  3.6   |  28  |  5.71<H>    Ca    9.0      03 Jul 2020 06:48  Phos  3.8     07-03  Mg     2.4     07-03    TPro  5.7<L>  /  Alb  2.4<L>  /  TBili  0.5  /  DBili  x   /  AST  82<H>  /  ALT  68  /  AlkPhos  153<H>  07-03    CAPILLARY BLOOD GLUCOSE      POCT Blood Glucose.: 222 mg/dL (03 Jul 2020 10:12)    PT/INR - ( 03 Jul 2020 06:48 )   PT: 21.8 sec;   INR: 2.01 ratio         PTT - ( 03 Jul 2020 06:48 )  PTT:30.8 sec    CARDIAC MARKERS ( 03 Jul 2020 06:48 )  5.940 ng/mL / x     / 478 U/L / x     / 23.5 ng/mL  CARDIAC MARKERS ( 03 Jul 2020 03:01 )  2.180 ng/mL / x     / 430 U/L / x     / x      CARDIAC MARKERS ( 02 Jul 2020 23:57 )  .134 ng/mL / x     / x     / x     / x            Impression:  * HD dependent ESRD  * S/p prolonged cardiac arrest  * ? anoxic encephalopathy    Recommendations:   * No dialytic urgency at present. Would like to avoid HD till hemodynamic status improves.   * Will re-eval in 24h  * Prognosis is guarded to poor.

## 2020-07-03 NOTE — ED PROVIDER NOTE - CLINICAL SUMMARY MEDICAL DECISION MAKING FREE TEXT BOX
arrived to the ED intubated in [facility]A  calccium and bicarbonate empirically administered as hyperK possible cause of cardiac arrest in consideration of ESRD on hemodialysis  ROSC achieved shortly after ED presentaiton.

## 2020-07-03 NOTE — H&P ADULT - NSHPPHYSICALEXAM_GEN_ALL_CORE
General: intubated and sedated  Neuro: no gag, has some twitches of upper extremities.   Pupils: pinpoint not reactive to light b/l   Lungs: clear to ascultation, decreased air entry on the right and at the bases b/l  Heart: S1, S2, no murmur heard   Abdomen: soft non distended, bowel sounds heard   Ext: 2 + edema on b/l Lower extremities  Right arm AV fistula   Skin: chest area with skin tears over keloid.   Sacral decubiti stage 2.     Lines and tubes:   Right tibial IO in place   Warner in place.  ETT and OGT present   Right femoral central line   2 peripheral lines on the left arm

## 2020-07-03 NOTE — CONSULT NOTE ADULT - SUBJECTIVE AND OBJECTIVE BOX
CHIEF COMPLAINT:  Patient is a 74y old  Male who presents with a chief complaint of s/p cardiac arrest (03 Jul 2020 10:48)      HPI:  74 year old man with past medical history of HTN, DM, ESRD on HD (MWF), and CAD s/p CABG. Patient had no recent illness. Went to [facility] yesterday for an appointment and told everything was fine. Patient was eating dinner and suddenly felt weak and collapsed. Down time before EMS arrived was 5 mins.   CPR was then initiated by EMS. Patient got 5 rounds of epi and ROSC was obtained after 40 mins. Patient is intubated.     CT brain showed grossly no intracranial hemorrhage or mass effect.   Chest CT scan reported Endotracheal tube, tip identified near the nacho with slight extension to right mainstem bronchus. Consider retraction of 4 cm for optimal positioning. No central pulmonary embolism. Evaluation of segmental to subsegmental branches limited secondary to suboptimal contrast bolus within these vessels. Postoperative changes of CABG. Cardiomegaly without significant pericardial effusion. Large right pleural effusion extending to the apex with complete right lower lobe atelectasis. Scattered groundglass opacities, most pronounced in bilateral lower lobes. Mild interlobular septal thickening. Findings are nonspecific, this may be on the basis of pulmonary edema. Recommend clinical correlation to assess for superimposed infection of viral and/or bacterial etiology. Suggestion of round atelectasis and/or scarring in the lingula. Incompletely characterize nodular densities in left lower lobes. Mild gallbladder wall edema.  Mild dilatation of the thoracic esophagus containing ingested material. Patient remains at increased risk for aspiration ascites. Mildly displaced fractures of the right anterolateral fifth and sixth ribs. Subcutaneous air and edema partially imaged in left upper extremity distally.    Patient will be admitted to the ICU for further management of acute respiratory failure, s/p cardiac arrest, CKD on HD, fluid overload, pulmonary edema, right pleural effusion, aspiration pneumonia, hyperglycemia, elevated troponin, possible sepsis and acidosis. (03 Jul 2020 01:15)    ALLERGIES:  No Known Allergies    Home Medications:    PAST MEDICAL & SURGICAL HISTORY:  PAD (Peripheral Artery Disease): s/p bilat iliac stents 2006  Renal Insufficiency  Diabetes Mellitus Type 2 in Nonobese  HTN (Hypertension), Benign  CAD (Coronary Artery Disease)        FAMILY HISTORY:  n/a    SOCIAL HISTORY:    REVIEW OF SYSTEMS:  General:  No wt loss, fevers, chills, night sweats  Eyes:  Good vision, no reported pain  ENT:  No sore throat, pain, runny nose, dysphagia  CV:  No pain, palpitations, hypo/hypertension  Resp:  No dyspnea, cough, tachypnea, wheezing  GI:  No pain, nausea, vomiting, diarrhea, constipation  :  No pain, bleeding, incontinence, nocturia  Muscle:  No pain, weakness  Breast:  No pain, abscess, mass, discharge  Neuro:  No weakness, tingling, memory problems  Psych:  No fatigue, insomnia, mood problems, depression  Endocrine:  No polyuria, polydipsia, cold/heat intolerance  Heme:  No petechiae, ecchymosis, easy bruisability  Skin:  No rash, edema    PHYSICAL EXAM:  Vital Signs:  Vital Signs Last 24 Hrs  T(C): 2 (03 Jul 2020 11:30), Max: 36.4 (02 Jul 2020 23:50)  T(F): 35.6 (03 Jul 2020 11:30), Max: 97.6 (02 Jul 2020 23:50)  HR: 83 (03 Jul 2020 13:30) (56 - 138)  BP: 138/55 (03 Jul 2020 13:30) (90/53 - 184/89)  BP(mean): 77 (03 Jul 2020 13:30) (58 - 96)  RR: 21 (03 Jul 2020 13:30) (14 - 28)  SpO2: 100% (03 Jul 2020 13:30) (96% - 100%)  I&O's Summary    02 Jul 2020 07:01  -  03 Jul 2020 07:00  --------------------------------------------------------  IN: 479 mL / OUT: 0 mL / NET: 479 mL    03 Jul 2020 07:01  -  03 Jul 2020 15:13  --------------------------------------------------------  IN: 727.7 mL / OUT: 0 mL / NET: 727.7 mL      I&O's Detail    02 Jul 2020 07:01  -  03 Jul 2020 07:00  --------------------------------------------------------  IN:    insulin regular Infusion: 54 mL    IV PiggyBack: 250 mL    norepinephrine Infusion: 114 mL    propofol Infusion: 61 mL  Total IN: 479 mL    OUT:  Total OUT: 0 mL    Total NET: 479 mL      03 Jul 2020 07:01  -  03 Jul 2020 15:13  --------------------------------------------------------  IN:    heparin  Infusion.: 24 mL    insulin regular Infusion: 23 mL    IV PiggyBack: 550 mL    norepinephrine Infusion: 57.5 mL    propofol Infusion: 73.2 mL  Total IN: 727.7 mL    OUT:  Total OUT: 0 mL    Total NET: 727.7 mL      Tele: Possible AF, pvcs    Constitutional: well developed, normal appearance, well groomed, well nourished, no deformities and no acute distress.   Eyes: the conjunctiva exhibited no abnormalities and the eyelids demonstrated no xanthelasmas.   HEENT: normal oral mucosa, no oral pallor and no oral cyanosis.   Neck: normal jugular venous A waves present, normal jugular venous V waves present.  Pulmonary: no respiratory distress, normal respiratory rhythm and effort, no accessory muscle use and lungs were clear to auscultation bilaterally.   Cardiovascular: heart rate and rhythm were normal, normal S1 and S2 and no murmur, gallop, rub, heave or thrill are present.   Abdomen: soft, non-tender  Musculoskeletal: the gait could not be assessed.  Extremities: no clubbing of the fingernails, no localized cyanosis, no petechial hemorrhages and no ischemic changes.   Skin: normal skin color and pigmentation, no rash, no venous stasis, no skin lesions, no skin ulcer and no xanthoma was observed.   Psychiatric: oriented to person, place, and time, the affect was normal, the mood was normal and not feeling anxious.      LABORATORY:                          10.6   8.08  )-----------( 181      ( 03 Jul 2020 03:01 )             31.6     07-03    139  |  101  |  53<H>  ----------------------------<  299<H>  3.6   |  28  |  5.71<H>    Ca    9.0      03 Jul 2020 06:48  Phos  3.8     07-03  Mg     2.4     07-03    TPro  5.7<L>  /  Alb  2.4<L>  /  TBili  0.5  /  DBili  x   /  AST  82<H>  /  ALT  68  /  AlkPhos  153<H>  07-03    ABG - ( 03 Jul 2020 10:43 )  pH, Arterial: x     pH, Blood: 7.46  /  pCO2: 37    /  pO2: 79    / HCO3: 25    / Base Excess: 2.0   /  SaO2: 97            CARDIAC MARKERS ( 03 Jul 2020 06:48 )  5.940 ng/mL / x     / 478 U/L / x     / 23.5 ng/mL  CARDIAC MARKERS ( 03 Jul 2020 03:01 )  2.180 ng/mL / x     / 430 U/L / x     / x      CARDIAC MARKERS ( 02 Jul 2020 23:57 )  .134 ng/mL / x     / x     / x     / x          CAPILLARY BLOOD GLUCOSE  POCT Blood Glucose.: 150 mg/dL (03 Jul 2020 14:16)  POCT Blood Glucose.: 178 mg/dL (03 Jul 2020 13:18)  POCT Blood Glucose.: 196 mg/dL (03 Jul 2020 12:08)  POCT Blood Glucose.: 185 mg/dL (03 Jul 2020 11:21)  POCT Blood Glucose.: 222 mg/dL (03 Jul 2020 10:12)  POCT Blood Glucose.: 208 mg/dL (03 Jul 2020 09:18)  POCT Blood Glucose.: 254 mg/dL (03 Jul 2020 08:16)  POCT Blood Glucose.: 258 mg/dL (03 Jul 2020 07:14)  POCT Blood Glucose.: 370 mg/dL (03 Jul 2020 06:12)  POCT Blood Glucose.: 369 mg/dL (03 Jul 2020 05:09)  POCT Blood Glucose.: 407 mg/dL (03 Jul 2020 04:09)  POCT Blood Glucose.: 484 mg/dL (03 Jul 2020 03:07)  POCT Blood Glucose.: 488 mg/dL (03 Jul 2020 02:07)  POCT Blood Glucose.: 479 mg/dL (03 Jul 2020 02:06)  POCT Blood Glucose.: 504 mg/dL (02 Jul 2020 22:56)  POCT Blood Glucose.: 466 mg/dL (02 Jul 2020 22:53)    LIVER FUNCTIONS - ( 03 Jul 2020 06:48 )  Alb: 2.4 g/dL / Pro: 5.7 gm/dL / ALK PHOS: 153 U/L / ALT: 68 U/L / AST: 82 U/L / GGT: x           PT/INR - ( 03 Jul 2020 06:48 )   PT: 21.8 sec;   INR: 2.01 ratio         PTT - ( 03 Jul 2020 06:48 )  PTT:30.8 sec    BNPSerum Pro-Brain Natriuretic Peptide: >[id number] pg/mL (07-02-20 @ 23:57)      IMAGING:  < from: Xray Chest 1 View- PORTABLE-Routine (07.03.20 @ 01:36) >    Pulmonary edema.    < end of copied text >    < from: CT Chest w/ IV Cont (07.03.20 @ 00:19) >  IMPRESSION:    Endotracheal tube, tip identified near the nacho with slight extension to right mainstem bronchus. Consider retraction of 4 cm for optimal positioning.    No central pulmonary embolism. Evaluation of segmental to subsegmental branches limited secondary to suboptimal contrast bolus within these vessels.    Postoperative changes of CABG. Cardiomegaly without significant pericardial effusion.     Large right pleural effusion extending to the apex with complete right lower lobe atelectasis. Scattered groundglass opacities, most pronounced in bilateral lower lobes. Mild interlobular septal thickening. Findings are nonspecific, this may be on the basis of pulmonary edema. Recommend clinical correlation to assess for superimposed infection of viral and/or bacterial etiology.     Suggestion of round atelectasis and/or scarring in the lingula. Incompletely characterize nodular densities in left lower lobes. Short term pulmonary imaging follow-up is advised.    Mild gallbladder wall edema. Consider correlation with right upper quadrant ultrasound to exclude gallbladder disease.    Mild dilatation of the thoracic esophagus containing ingested material. Patient remains at increased risk for aspiration.    Trace ascites.     Mildly displaced fractures of the right anterolateral fifth and sixth ribs.Subcutaneous air and edema partially imaged in left upper extremity distally. Recommend clinical correlation.    Additional findings as mentioned above.    These results were discussed via telephone at 7/3/2020 12:51 AM by Dr. Baumann of radiology with Dr. Pinedo, institution read-back verification policy was followed.    < end of copied text >      ASSESSMENT:  74 year old man with past medical history of HTN, DM, ESRD on HD (MWF), and CAD s/p CABG s/p cardiac arrest.    PLAN:       MEDICATIONS  (STANDING):  aspirin  chewable 81 milliGRAM(s) Oral daily  chlorhexidine 0.12% Liquid 15 milliLiter(s) Oral Mucosa every 12 hours  chlorhexidine 4% Liquid 1 Application(s) Topical <User Schedule>  clopidogrel Tablet 75 milliGRAM(s) Oral daily  dextrose 5% + sodium chloride 0.45%. 1000 milliLiter(s) (75 mL/Hr) IV Continuous <Continuous>  heparin  Infusion.  Unit(s)/Hr (8 mL/Hr) IV Continuous <Continuous>  insulin lispro (HumaLOG) corrective regimen sliding scale   SubCutaneous every 4 hours  insulin regular Infusion 6 Unit(s)/Hr (6 mL/Hr) IV Continuous <Continuous>  levETIRAcetam  IVPB 500 milliGRAM(s) IV Intermittent every 12 hours   norepinephrine Infusion 0.05 MICROgram(s)/kG/Min (3.19 mL/Hr) IV Continuous <Continuous>  pantoprazole  Injectable 40 milliGRAM(s) IV Push daily  piperacillin/tazobactam IVPB.. 3.375 Gram(s) IV Intermittent every 12 hours  propofol Infusion 5 MICROgram(s)/kG/Min (2.04 mL/Hr) IV Continuous <Continuous>    echo pending.    Tariq Kaur MD, FACC, FASDELMIS, FASNC, FACP  Director, Heart Failure Services  [facility]  , Department of Cardiology  [facility] CHIEF COMPLAINT:  Patient is a 74y old  Male who presents with a chief complaint of s/p cardiac arrest (03 Jul 2020 10:48)      HPI:  74 year old man with past medical history of HTN, DM, ESRD on HD (MWF), and CAD s/p CABG. Patient had no recent illness. Went to PMD yesterday for an appointment and told everything was fine. Patient was eating dinner and suddenly felt weak and collapsed. Down time before EMS arrived was 5 mins.   CPR was then initiated by EMS. Patient got 5 rounds of epi and ROSC was obtained after 40 mins. Patient is intubated.     CT brain showed grossly no intracranial hemorrhage or mass effect.   Chest CT scan reported Endotracheal tube, tip identified near the nacho with slight extension to right mainstem bronchus. Consider retraction of 4 cm for optimal positioning. No central pulmonary embolism. Evaluation of segmental to subsegmental branches limited secondary to suboptimal contrast bolus within these vessels. Postoperative changes of CABG. Cardiomegaly without significant pericardial effusion. Large right pleural effusion extending to the apex with complete right lower lobe atelectasis. Scattered groundglass opacities, most pronounced in bilateral lower lobes. Mild interlobular septal thickening. Findings are nonspecific, this may be on the basis of pulmonary edema. Recommend clinical correlation to assess for superimposed infection of viral and/or bacterial etiology. Suggestion of round atelectasis and/or scarring in the lingula. Incompletely characterize nodular densities in left lower lobes. Mild gallbladder wall edema.  Mild dilatation of the thoracic esophagus containing ingested material. Patient remains at increased risk for aspiration ascites. Mildly displaced fractures of the right anterolateral fifth and sixth ribs. Subcutaneous air and edema partially imaged in left upper extremity distally.    Patient will be admitted to the ICU for further management of acute respiratory failure, s/p cardiac arrest, CKD on HD, fluid overload, pulmonary edema, right pleural effusion, aspiration pneumonia, hyperglycemia, elevated troponin, possible sepsis and acidosis. (03 Jul 2020 01:15)    ALLERGIES:  No Known Allergies    Home Medications:    PAST MEDICAL & SURGICAL HISTORY:  PAD (Peripheral Artery Disease): s/p bilat iliac stents 2006  Renal Insufficiency  Diabetes Mellitus Type 2 in Nonobese  HTN (Hypertension), Benign  CAD (Coronary Artery Disease)        FAMILY HISTORY:  n/a    SOCIAL HISTORY:    REVIEW OF SYSTEMS:  General:  No wt loss, fevers, chills, night sweats  Eyes:  Good vision, no reported pain  ENT:  No sore throat, pain, runny nose, dysphagia  CV:  No pain, palpitations, hypo/hypertension  Resp:  No dyspnea, cough, tachypnea, wheezing  GI:  No pain, nausea, vomiting, diarrhea, constipation  :  No pain, bleeding, incontinence, nocturia  Muscle:  No pain, weakness  Breast:  No pain, abscess, mass, discharge  Neuro:  No weakness, tingling, memory problems  Psych:  No fatigue, insomnia, mood problems, depression  Endocrine:  No polyuria, polydipsia, cold/heat intolerance  Heme:  No petechiae, ecchymosis, easy bruisability  Skin:  No rash, edema    PHYSICAL EXAM:  Vital Signs:  Vital Signs Last 24 Hrs  T(C): 2 (03 Jul 2020 11:30), Max: 36.4 (02 Jul 2020 23:50)  T(F): 35.6 (03 Jul 2020 11:30), Max: 97.6 (02 Jul 2020 23:50)  HR: 83 (03 Jul 2020 13:30) (56 - 138)  BP: 138/55 (03 Jul 2020 13:30) (90/53 - 184/89)  BP(mean): 77 (03 Jul 2020 13:30) (58 - 96)  RR: 21 (03 Jul 2020 13:30) (14 - 28)  SpO2: 100% (03 Jul 2020 13:30) (96% - 100%)  I&O's Summary    02 Jul 2020 07:01  -  03 Jul 2020 07:00  --------------------------------------------------------  IN: 479 mL / OUT: 0 mL / NET: 479 mL    03 Jul 2020 07:01  -  03 Jul 2020 15:13  --------------------------------------------------------  IN: 727.7 mL / OUT: 0 mL / NET: 727.7 mL      I&O's Detail    02 Jul 2020 07:01  -  03 Jul 2020 07:00  --------------------------------------------------------  IN:    insulin regular Infusion: 54 mL    IV PiggyBack: 250 mL    norepinephrine Infusion: 114 mL    propofol Infusion: 61 mL  Total IN: 479 mL    OUT:  Total OUT: 0 mL    Total NET: 479 mL      03 Jul 2020 07:01  -  03 Jul 2020 15:13  --------------------------------------------------------  IN:    heparin  Infusion.: 24 mL    insulin regular Infusion: 23 mL    IV PiggyBack: 550 mL    norepinephrine Infusion: 57.5 mL    propofol Infusion: 73.2 mL  Total IN: 727.7 mL    OUT:  Total OUT: 0 mL    Total NET: 727.7 mL      Tele: Possible AF, pvcs    Constitutional: well developed, normal appearance, well groomed, well nourished, no deformities and no acute distress.   Eyes: the conjunctiva exhibited no abnormalities and the eyelids demonstrated no xanthelasmas.   HEENT: normal oral mucosa, no oral pallor and no oral cyanosis.   Neck: normal jugular venous A waves present, normal jugular venous V waves present.  Pulmonary: no respiratory distress, normal respiratory rhythm and effort, no accessory muscle use and lungs were clear to auscultation bilaterally.   Cardiovascular: heart rate and rhythm were normal, normal S1 and S2 and no murmur, gallop, rub, heave or thrill are present.   Abdomen: soft, non-tender  Musculoskeletal: the gait could not be assessed.  Extremities: no clubbing of the fingernails, no localized cyanosis, no petechial hemorrhages and no ischemic changes.   Skin: normal skin color and pigmentation, no rash, no venous stasis, no skin lesions, no skin ulcer and no xanthoma was observed.   Psychiatric: oriented to person, place, and time, the affect was normal, the mood was normal and not feeling anxious.      LABORATORY:                          10.6   8.08  )-----------( 181      ( 03 Jul 2020 03:01 )             31.6     07-03    139  |  101  |  53<H>  ----------------------------<  299<H>  3.6   |  28  |  5.71<H>    Ca    9.0      03 Jul 2020 06:48  Phos  3.8     07-03  Mg     2.4     07-03    TPro  5.7<L>  /  Alb  2.4<L>  /  TBili  0.5  /  DBili  x   /  AST  82<H>  /  ALT  68  /  AlkPhos  153<H>  07-03    ABG - ( 03 Jul 2020 10:43 )  pH, Arterial: x     pH, Blood: 7.46  /  pCO2: 37    /  pO2: 79    / HCO3: 25    / Base Excess: 2.0   /  SaO2: 97            CARDIAC MARKERS ( 03 Jul 2020 06:48 )  5.940 ng/mL / x     / 478 U/L / x     / 23.5 ng/mL  CARDIAC MARKERS ( 03 Jul 2020 03:01 )  2.180 ng/mL / x     / 430 U/L / x     / x      CARDIAC MARKERS ( 02 Jul 2020 23:57 )  .134 ng/mL / x     / x     / x     / x          CAPILLARY BLOOD GLUCOSE  POCT Blood Glucose.: 150 mg/dL (03 Jul 2020 14:16)  POCT Blood Glucose.: 178 mg/dL (03 Jul 2020 13:18)  POCT Blood Glucose.: 196 mg/dL (03 Jul 2020 12:08)  POCT Blood Glucose.: 185 mg/dL (03 Jul 2020 11:21)  POCT Blood Glucose.: 222 mg/dL (03 Jul 2020 10:12)  POCT Blood Glucose.: 208 mg/dL (03 Jul 2020 09:18)  POCT Blood Glucose.: 254 mg/dL (03 Jul 2020 08:16)  POCT Blood Glucose.: 258 mg/dL (03 Jul 2020 07:14)  POCT Blood Glucose.: 370 mg/dL (03 Jul 2020 06:12)  POCT Blood Glucose.: 369 mg/dL (03 Jul 2020 05:09)  POCT Blood Glucose.: 407 mg/dL (03 Jul 2020 04:09)  POCT Blood Glucose.: 484 mg/dL (03 Jul 2020 03:07)  POCT Blood Glucose.: 488 mg/dL (03 Jul 2020 02:07)  POCT Blood Glucose.: 479 mg/dL (03 Jul 2020 02:06)  POCT Blood Glucose.: 504 mg/dL (02 Jul 2020 22:56)  POCT Blood Glucose.: 466 mg/dL (02 Jul 2020 22:53)    LIVER FUNCTIONS - ( 03 Jul 2020 06:48 )  Alb: 2.4 g/dL / Pro: 5.7 gm/dL / ALK PHOS: 153 U/L / ALT: 68 U/L / AST: 82 U/L / GGT: x           PT/INR - ( 03 Jul 2020 06:48 )   PT: 21.8 sec;   INR: 2.01 ratio         PTT - ( 03 Jul 2020 06:48 )  PTT:30.8 sec    BNPSerum Pro-Brain Natriuretic Peptide: >[id number] pg/mL (07-02-20 @ 23:57)      IMAGING:  < from: Xray Chest 1 View- PORTABLE-Routine (07.03.20 @ 01:36) >    Pulmonary edema.    < end of copied text >    < from: CT Chest w/ IV Cont (07.03.20 @ 00:19) >  IMPRESSION:    Endotracheal tube, tip identified near the nacho with slight extension to right mainstem bronchus. Consider retraction of 4 cm for optimal positioning.    No central pulmonary embolism. Evaluation of segmental to subsegmental branches limited secondary to suboptimal contrast bolus within these vessels.    Postoperative changes of CABG. Cardiomegaly without significant pericardial effusion.     Large right pleural effusion extending to the apex with complete right lower lobe atelectasis. Scattered groundglass opacities, most pronounced in bilateral lower lobes. Mild interlobular septal thickening. Findings are nonspecific, this may be on the basis of pulmonary edema. Recommend clinical correlation to assess for superimposed infection of viral and/or bacterial etiology.     Suggestion of round atelectasis and/or scarring in the lingula. Incompletely characterize nodular densities in left lower lobes. Short term pulmonary imaging follow-up is advised.    Mild gallbladder wall edema. Consider correlation with right upper quadrant ultrasound to exclude gallbladder disease.    Mild dilatation of the thoracic esophagus containing ingested material. Patient remains at increased risk for aspiration.    Trace ascites.     Mildly displaced fractures of the right anterolateral fifth and sixth ribs.Subcutaneous air and edema partially imaged in left upper extremity distally. Recommend clinical correlation.    Additional findings as mentioned above.    These results were discussed via telephone at 7/3/2020 12:51 AM by Dr. Baumann of radiology with Dr. Pinedo, institution read-back verification policy was followed.    < end of copied text >      ASSESSMENT:  74 year old man with past medical history of HTN, DM, ESRD on HD (MWF), and CAD s/p CABG s/p cardiac arrest.    PLAN:       MEDICATIONS  (STANDING):  aspirin  chewable 81 milliGRAM(s) Oral daily  chlorhexidine 0.12% Liquid 15 milliLiter(s) Oral Mucosa every 12 hours  chlorhexidine 4% Liquid 1 Application(s) Topical <User Schedule>  clopidogrel Tablet 75 milliGRAM(s) Oral daily  dextrose 5% + sodium chloride 0.45%. 1000 milliLiter(s) (75 mL/Hr) IV Continuous <Continuous>  heparin  Infusion.  Unit(s)/Hr (8 mL/Hr) IV Continuous <Continuous>  insulin lispro (HumaLOG) corrective regimen sliding scale   SubCutaneous every 4 hours  insulin regular Infusion 6 Unit(s)/Hr (6 mL/Hr) IV Continuous <Continuous>  levETIRAcetam  IVPB 500 milliGRAM(s) IV Intermittent every 12 hours   norepinephrine Infusion 0.05 MICROgram(s)/kG/Min (3.19 mL/Hr) IV Continuous <Continuous>  pantoprazole  Injectable 40 milliGRAM(s) IV Push daily  piperacillin/tazobactam IVPB.. 3.375 Gram(s) IV Intermittent every 12 hours  propofol Infusion 5 MICROgram(s)/kG/Min (2.04 mL/Hr) IV Continuous <Continuous>    echo pending.  vent support.  try to wean off levophed.    Tariq Kaur MD, FACC, FASE, FASNC, FACP  Director, Heart Failure Services  [facility]  , Department of Cardiology  [facility] CHIEF COMPLAINT:  Patient is a 74y old  Male who presents with a chief complaint of s/p cardiac arrest (03 Jul 2020 10:48)      HPI:  74 year old man with past medical history of HTN, DM, ESRD on HD (MWF), and CAD s/p CABG. Patient had no recent illness. Went to PMD yesterday for an appointment and told everything was fine. Patient was eating dinner and suddenly felt weak and collapsed. Down time before EMS arrived was 5 mins.   CPR was then initiated by EMS. Patient got 5 rounds of epi and ROSC was obtained after 40 mins. Patient is intubated.     CT brain showed grossly no intracranial hemorrhage or mass effect.   Chest CT scan reported Endotracheal tube, tip identified near the nacho with slight extension to right mainstem bronchus. Consider retraction of 4 cm for optimal positioning. No central pulmonary embolism. Evaluation of segmental to subsegmental branches limited secondary to suboptimal contrast bolus within these vessels. Postoperative changes of CABG. Cardiomegaly without significant pericardial effusion. Large right pleural effusion extending to the apex with complete right lower lobe atelectasis. Scattered groundglass opacities, most pronounced in bilateral lower lobes. Mild interlobular septal thickening. Findings are nonspecific, this may be on the basis of pulmonary edema. Recommend clinical correlation to assess for superimposed infection of viral and/or bacterial etiology. Suggestion of round atelectasis and/or scarring in the lingula. Incompletely characterize nodular densities in left lower lobes. Mild gallbladder wall edema.  Mild dilatation of the thoracic esophagus containing ingested material. Patient remains at increased risk for aspiration ascites. Mildly displaced fractures of the right anterolateral fifth and sixth ribs. Subcutaneous air and edema partially imaged in left upper extremity distally.    Patient will be admitted to the ICU for further management of acute respiratory failure, s/p cardiac arrest, CKD on HD, fluid overload, pulmonary edema, right pleural effusion, aspiration pneumonia, hyperglycemia, elevated troponin, possible sepsis and acidosis. (03 Jul 2020 01:15)    ALLERGIES:  No Known Allergies    Home Medications:    PAST MEDICAL & SURGICAL HISTORY:  PAD (Peripheral Artery Disease): s/p bilat iliac stents 2006  Renal Insufficiency  Diabetes Mellitus Type 2 in Nonobese  HTN (Hypertension), Benign  CAD (Coronary Artery Disease)        FAMILY HISTORY:  n/a    SOCIAL HISTORY:    REVIEW OF SYSTEMS:  General:  No wt loss, fevers, chills, night sweats  Eyes:  Good vision, no reported pain  ENT:  No sore throat, pain, runny nose, dysphagia  CV:  No pain, palpitations, hypo/hypertension  Resp:  No dyspnea, cough, tachypnea, wheezing  GI:  No pain, nausea, vomiting, diarrhea, constipation  :  No pain, bleeding, incontinence, nocturia  Muscle:  No pain, weakness  Breast:  No pain, abscess, mass, discharge  Neuro:  No weakness, tingling, memory problems  Psych:  No fatigue, insomnia, mood problems, depression  Endocrine:  No polyuria, polydipsia, cold/heat intolerance  Heme:  No petechiae, ecchymosis, easy bruisability  Skin:  No rash, edema    PHYSICAL EXAM:  Vital Signs:  Vital Signs Last 24 Hrs  T(C): 2 (03 Jul 2020 11:30), Max: 36.4 (02 Jul 2020 23:50)  T(F): 35.6 (03 Jul 2020 11:30), Max: 97.6 (02 Jul 2020 23:50)  HR: 83 (03 Jul 2020 13:30) (56 - 138)  BP: 138/55 (03 Jul 2020 13:30) (90/53 - 184/89)  BP(mean): 77 (03 Jul 2020 13:30) (58 - 96)  RR: 21 (03 Jul 2020 13:30) (14 - 28)  SpO2: 100% (03 Jul 2020 13:30) (96% - 100%)  I&O's Summary    02 Jul 2020 07:01  -  03 Jul 2020 07:00  --------------------------------------------------------  IN: 479 mL / OUT: 0 mL / NET: 479 mL    03 Jul 2020 07:01  -  03 Jul 2020 15:13  --------------------------------------------------------  IN: 727.7 mL / OUT: 0 mL / NET: 727.7 mL      I&O's Detail    02 Jul 2020 07:01  -  03 Jul 2020 07:00  --------------------------------------------------------  IN:    insulin regular Infusion: 54 mL    IV PiggyBack: 250 mL    norepinephrine Infusion: 114 mL    propofol Infusion: 61 mL  Total IN: 479 mL    OUT:  Total OUT: 0 mL    Total NET: 479 mL      03 Jul 2020 07:01  -  03 Jul 2020 15:13  --------------------------------------------------------  IN:    heparin  Infusion.: 24 mL    insulin regular Infusion: 23 mL    IV PiggyBack: 550 mL    norepinephrine Infusion: 57.5 mL    propofol Infusion: 73.2 mL  Total IN: 727.7 mL    OUT:  Total OUT: 0 mL    Total NET: 727.7 mL      Tele: Possible AF, pvcs    Constitutional: well developed, normal appearance, well groomed, well nourished, no deformities and no acute distress.   Eyes: the conjunctiva exhibited no abnormalities and the eyelids demonstrated no xanthelasmas.   HEENT: normal oral mucosa, no oral pallor and no oral cyanosis.   Neck: normal jugular venous A waves present, normal jugular venous V waves present.  Pulmonary: no respiratory distress, normal respiratory rhythm and effort, no accessory muscle use and lungs were clear to auscultation bilaterally.   Cardiovascular: heart rate and rhythm were normal, normal S1 and S2 and no murmur, gallop, rub, heave or thrill are present.   Abdomen: soft, non-tender  Musculoskeletal: the gait could not be assessed.  Extremities: no clubbing of the fingernails, no localized cyanosis, no petechial hemorrhages and no ischemic changes.   Skin: normal skin color and pigmentation, no rash, no venous stasis, no skin lesions, no skin ulcer and no xanthoma was observed.   Psychiatric: oriented to person, place, and time, the affect was normal, the mood was normal and not feeling anxious.      LABORATORY:                          10.6   8.08  )-----------( 181      ( 03 Jul 2020 03:01 )             31.6     07-03    139  |  101  |  53<H>  ----------------------------<  299<H>  3.6   |  28  |  5.71<H>    Ca    9.0      03 Jul 2020 06:48  Phos  3.8     07-03  Mg     2.4     07-03    TPro  5.7<L>  /  Alb  2.4<L>  /  TBili  0.5  /  DBili  x   /  AST  82<H>  /  ALT  68  /  AlkPhos  153<H>  07-03    ABG - ( 03 Jul 2020 10:43 )  pH, Arterial: x     pH, Blood: 7.46  /  pCO2: 37    /  pO2: 79    / HCO3: 25    / Base Excess: 2.0   /  SaO2: 97            CARDIAC MARKERS ( 03 Jul 2020 06:48 )  5.940 ng/mL / x     / 478 U/L / x     / 23.5 ng/mL  CARDIAC MARKERS ( 03 Jul 2020 03:01 )  2.180 ng/mL / x     / 430 U/L / x     / x      CARDIAC MARKERS ( 02 Jul 2020 23:57 )  .134 ng/mL / x     / x     / x     / x          CAPILLARY BLOOD GLUCOSE  POCT Blood Glucose.: 150 mg/dL (03 Jul 2020 14:16)  POCT Blood Glucose.: 178 mg/dL (03 Jul 2020 13:18)  POCT Blood Glucose.: 196 mg/dL (03 Jul 2020 12:08)  POCT Blood Glucose.: 185 mg/dL (03 Jul 2020 11:21)  POCT Blood Glucose.: 222 mg/dL (03 Jul 2020 10:12)  POCT Blood Glucose.: 208 mg/dL (03 Jul 2020 09:18)  POCT Blood Glucose.: 254 mg/dL (03 Jul 2020 08:16)  POCT Blood Glucose.: 258 mg/dL (03 Jul 2020 07:14)  POCT Blood Glucose.: 370 mg/dL (03 Jul 2020 06:12)  POCT Blood Glucose.: 369 mg/dL (03 Jul 2020 05:09)  POCT Blood Glucose.: 407 mg/dL (03 Jul 2020 04:09)  POCT Blood Glucose.: 484 mg/dL (03 Jul 2020 03:07)  POCT Blood Glucose.: 488 mg/dL (03 Jul 2020 02:07)  POCT Blood Glucose.: 479 mg/dL (03 Jul 2020 02:06)  POCT Blood Glucose.: 504 mg/dL (02 Jul 2020 22:56)  POCT Blood Glucose.: 466 mg/dL (02 Jul 2020 22:53)    LIVER FUNCTIONS - ( 03 Jul 2020 06:48 )  Alb: 2.4 g/dL / Pro: 5.7 gm/dL / ALK PHOS: 153 U/L / ALT: 68 U/L / AST: 82 U/L / GGT: x           PT/INR - ( 03 Jul 2020 06:48 )   PT: 21.8 sec;   INR: 2.01 ratio         PTT - ( 03 Jul 2020 06:48 )  PTT:30.8 sec    BNPSerum Pro-Brain Natriuretic Peptide: >[id number] pg/mL (07-02-20 @ 23:57)      IMAGING:  < from: Xray Chest 1 View- PORTABLE-Routine (07.03.20 @ 01:36) >    Pulmonary edema.    < end of copied text >    < from: CT Chest w/ IV Cont (07.03.20 @ 00:19) >  IMPRESSION:    Endotracheal tube, tip identified near the nacho with slight extension to right mainstem bronchus. Consider retraction of 4 cm for optimal positioning.    No central pulmonary embolism. Evaluation of segmental to subsegmental branches limited secondary to suboptimal contrast bolus within these vessels.    Postoperative changes of CABG. Cardiomegaly without significant pericardial effusion.     Large right pleural effusion extending to the apex with complete right lower lobe atelectasis. Scattered groundglass opacities, most pronounced in bilateral lower lobes. Mild interlobular septal thickening. Findings are nonspecific, this may be on the basis of pulmonary edema. Recommend clinical correlation to assess for superimposed infection of viral and/or bacterial etiology.     Suggestion of round atelectasis and/or scarring in the lingula. Incompletely characterize nodular densities in left lower lobes. Short term pulmonary imaging follow-up is advised.    Mild gallbladder wall edema. Consider correlation with right upper quadrant ultrasound to exclude gallbladder disease.    Mild dilatation of the thoracic esophagus containing ingested material. Patient remains at increased risk for aspiration.    Trace ascites.     Mildly displaced fractures of the right anterolateral fifth and sixth ribs.Subcutaneous air and edema partially imaged in left upper extremity distally. Recommend clinical correlation.    Additional findings as mentioned above.    These results were discussed via telephone at 7/3/2020 12:51 AM by Dr. Baumann of radiology with Dr. Pinedo, institution read-back verification policy was followed.    < end of copied text >      ASSESSMENT:  74 year old man with past medical history of HTN, DM, ESRD on HD (MWF), and CAD s/p CABG s/p cardiac arrest.    PLAN:       MEDICATIONS  (STANDING):  aspirin  chewable 81 milliGRAM(s) Oral daily  chlorhexidine 0.12% Liquid 15 milliLiter(s) Oral Mucosa every 12 hours  chlorhexidine 4% Liquid 1 Application(s) Topical <User Schedule>  clopidogrel Tablet 75 milliGRAM(s) Oral daily  dextrose 5% + sodium chloride 0.45%. 1000 milliLiter(s) (75 mL/Hr) IV Continuous <Continuous>  heparin  Infusion.  Unit(s)/Hr (8 mL/Hr) IV Continuous <Continuous>  insulin lispro (HumaLOG) corrective regimen sliding scale   SubCutaneous every 4 hours  insulin regular Infusion 6 Unit(s)/Hr (6 mL/Hr) IV Continuous <Continuous>  levETIRAcetam  IVPB 500 milliGRAM(s) IV Intermittent every 12 hours   norepinephrine Infusion 0.05 MICROgram(s)/kG/Min (3.19 mL/Hr) IV Continuous <Continuous>  pantoprazole  Injectable 40 milliGRAM(s) IV Push daily  piperacillin/tazobactam IVPB.. 3.375 Gram(s) IV Intermittent every 12 hours  propofol Infusion 5 MICROgram(s)/kG/Min (2.04 mL/Hr) IV Continuous <Continuous>    echo pending.  vent support.  try to wean off levophed.  ecg shows ectopic atrial rhtyhm, lateral ST depressions may represent ischemic abnls vs htn changes.  stay on asa/plavix/heparin infusion.  consider statin while watching LFTs.     Tariq Kaur MD, FACC, FASDELMIS, YOU, FACP  Director, Heart Failure Services  [facility]  , Department of Cardiology  [facility]

## 2020-07-03 NOTE — H&P ADULT - HISTORY OF PRESENT ILLNESS
History obtain from ED physician as pt was intubated and no family around.     74 year old man with past medical history of HTN, DM, ESRD on HD (MWF), and CAD s/p CABG. Patient had no recent illness. Went to [facility] yesterday for an appointment and told everything was fine. Patient was eating dinner and suddenly felt weak and collapsed. Down time before EMS arrived was 5 mins.   CPR was then initiated by EMS. Patient got 5 rounds of epi and ROSC was obtained after 40 mins. Patient is intubated.     CT brain showed grossly no intracranial hemorrhage or mass effect.   Chest CT scan reported Endotracheal tube, tip identified near the nacho with slight extension to right mainstem bronchus. Consider retraction of 4 cm for optimal positioning. No central pulmonary embolism. Evaluation of segmental to subsegmental branches limited secondary to suboptimal contrast bolus within these vessels. Postoperative changes of CABG. Cardiomegaly without significant pericardial effusion. Large right pleural effusion extending to the apex with complete right lower lobe atelectasis. Scattered groundglass opacities, most pronounced in bilateral lower lobes. Mild interlobular septal thickening. Findings are nonspecific, this may be on the basis of pulmonary edema. Recommend clinical correlation to assess for superimposed infection of viral and/or bacterial etiology. Suggestion of round atelectasis and/or scarring in the lingula. Incompletely characterize nodular densities in left lower lobes. Mild gallbladder wall edema.  Mild dilatation of the thoracic esophagus containing ingested material. Patient remains at increased risk for aspiration ascites. Mildly displaced fractures of the right anterolateral fifth and sixth ribs. Subcutaneous air and edema partially imaged in left upper extremity distally.    Patient will be admitted to the ICU for further management of Acute respiratory failure, s/p cardiac arrest, CKD on HD, fluid overload, pulmonary edema, right pleural effusion, demand ischemia, hyperglycemia, possible sepsis and acidosis. History obtain from ED physician as pt was intubated and no family around.     74 year old man with past medical history of HTN, DM, ESRD on HD (MWF), and CAD s/p CABG. Patient had no recent illness. Went to [facility] yesterday for an appointment and told everything was fine. Patient was eating dinner and suddenly felt weak and collapsed. Down time before EMS arrived was 5 mins.   CPR was then initiated by EMS. Patient got 5 rounds of epi and ROSC was obtained after 40 mins. Patient is intubated.     CT brain showed grossly no intracranial hemorrhage or mass effect.   Chest CT scan reported Endotracheal tube, tip identified near the nacho with slight extension to right mainstem bronchus. Consider retraction of 4 cm for optimal positioning. No central pulmonary embolism. Evaluation of segmental to subsegmental branches limited secondary to suboptimal contrast bolus within these vessels. Postoperative changes of CABG. Cardiomegaly without significant pericardial effusion. Large right pleural effusion extending to the apex with complete right lower lobe atelectasis. Scattered groundglass opacities, most pronounced in bilateral lower lobes. Mild interlobular septal thickening. Findings are nonspecific, this may be on the basis of pulmonary edema. Recommend clinical correlation to assess for superimposed infection of viral and/or bacterial etiology. Suggestion of round atelectasis and/or scarring in the lingula. Incompletely characterize nodular densities in left lower lobes. Mild gallbladder wall edema.  Mild dilatation of the thoracic esophagus containing ingested material. Patient remains at increased risk for aspiration ascites. Mildly displaced fractures of the right anterolateral fifth and sixth ribs. Subcutaneous air and edema partially imaged in left upper extremity distally.    Patient will be admitted to the ICU for further management of acute respiratory failure, s/p cardiac arrest, CKD on HD, fluid overload, pulmonary edema, right pleural effusion, aspiration pneumonia, hyperglycemia, elevated troponin, possible sepsis and acidosis.

## 2020-07-04 NOTE — PROGRESS NOTE ADULT - ASSESSMENT
73 y/o M w/ESRD on HD, CAD s/p CABG, DM admitted post cardiac arrest. Prolonged downtime. Likely anoxic brain injury. Seizures vs myoclonus. Acute respiratory failure w/hypoxia and hypercapnia. Likely NSTEMI. Hypotension likely multifactorial. Likely brain dead    - Follow up EEG read  - Brain death exam ~ 72 hrs post arrest if no return of brainstem reflexes  - Keppra for possible seizures/myoclonus  - Continue mechanical ventilation  - Pressor support as needed goal MAP >= 65  - Heparin gtt, ASA, plavix, statin  - HD as per renal  - Insulin for hyperglycemia  - Empiric broad spectrum abx  - GOC discussions, poor prognosis    I have personally provided 40 minutes of attending critical care time 73 y/o M w/ESRD on HD, CAD s/p CABG, DM admitted post cardiac arrest. Prolonged downtime. Likely anoxic brain injury. Seizures vs myoclonus. Acute respiratory failure w/hypoxia and hypercapnia. Likely NSTEMI. Hypotension likely multifactorial.    - Follow up EEG read  - Keppra for possible seizures/myoclonus  - Continue mechanical ventilation  - Pressor support as needed goal MAP >= 65  - Heparin gtt, ASA, plavix, statin  - HD as per renal  - Insulin for hyperglycemia  - Empiric broad spectrum abx  - GOC discussions, poor prognosis, possible withdrawal of care    I have personally provided 40 minutes of attending critical care time

## 2020-07-04 NOTE — PROGRESS NOTE ADULT - SUBJECTIVE AND OBJECTIVE BOX
HPI:  History obtain from ED physician as pt was intubated and no family around.     74 year old man with past medical history of HTN, DM, ESRD on HD (MWF), and CAD s/p CABG. Patient had no recent illness. Went to [facility]D yesterday for an appointment and told everything was fine. Patient was eating dinner and suddenly felt weak and collapsed. Down time before EMS arrived was 5 mins.   CPR was then initiated by EMS. Patient got 5 rounds of epi and ROSC was obtained after 40 mins. Patient is intubated.     CT brain showed grossly no intracranial hemorrhage or mass effect.   Chest CT scan reported Endotracheal tube, tip identified near the nacho with slight extension to right mainstem bronchus. Consider retraction of 4 cm for optimal positioning. No central pulmonary embolism. Evaluation of segmental to subsegmental branches limited secondary to suboptimal contrast bolus within these vessels. Postoperative changes of CABG. Cardiomegaly without significant pericardial effusion. Large right pleural effusion extending to the apex with complete right lower lobe atelectasis. Scattered groundglass opacities, most pronounced in bilateral lower lobes. Mild interlobular septal thickening. Findings are nonspecific, this may be on the basis of pulmonary edema. Recommend clinical correlation to assess for superimposed infection of viral and/or bacterial etiology. Suggestion of round atelectasis and/or scarring in the lingula. Incompletely characterize nodular densities in left lower lobes. Mild gallbladder wall edema.  Mild dilatation of the thoracic esophagus containing ingested material. Patient remains at increased risk for aspiration ascites. Mildly displaced fractures of the right anterolateral fifth and sixth ribs. Subcutaneous air and edema partially imaged in left upper extremity distally.    Patient will be admitted to the ICU for further management of acute respiratory failure, s/p cardiac arrest, CKD on HD, fluid overload, pulmonary edema, right pleural effusion, aspiration pneumonia, hyperglycemia, elevated troponin, possible sepsis and acidosis. (03 Jul 2020 01:15)      24 hr events: No acute events. Unable to provide history.    ## ROS:  [x ] unable to obtain    ## Vitals  ICU Vital Signs Last 24 Hrs  T(C): 36.5 (04 Jul 2020 06:00), Max: 37 (03 Jul 2020 15:46)  T(F): 97.7 (04 Jul 2020 06:00), Max: 98.6 (03 Jul 2020 15:46)  HR: 69 (04 Jul 2020 07:00) (64 - 123)  BP: 120/53 (04 Jul 2020 07:00) (90/53 - 164/63)  BP(mean): 68 (04 Jul 2020 07:00) (52 - 96)  ABP: --  ABP(mean): --  RR: 23 (04 Jul 2020 07:00) (14 - 26)  SpO2: 100% (04 Jul 2020 07:00) (96% - 100%)      ## Physical Exam:  Gen: Elderly male lying in bed, NAD  HEENT: NC/AT sclerae anicteric  Resp: Mechanical breath sounds b/l. not overbreathing vent  CV: S1, S2  Abd: Soft, + BS  Ext: Cool to touch  Neuro: No appreciable brainstem reflexes    ## Vent Data  Mode: AC/ CMV (Assist Control/ Continuous Mandatory Ventilation)  RR (machine): 14  TV (machine): 450  FiO2: 40  PEEP: 5  ITime: 0.9  MAP: 13  PIP: 29      ## Labs:  Chem:  07-04    136  |  98  |  62<H>  ----------------------------<  183<H>  5.0   |  24  |  5.92<H>    Ca    9.0      04 Jul 2020 03:50  Phos  3.4     07-04  Mg     2.1     07-04    TPro  5.9<L>  /  Alb  2.1<L>  /  TBili  0.6  /  DBili  x   /  AST  86<H>  /  ALT  53  /  AlkPhos  111  07-04    LIVER FUNCTIONS - ( 04 Jul 2020 03:50 )  Alb: 2.1 g/dL / Pro: 5.9 gm/dL / ALK PHOS: 111 U/L / ALT: 53 U/L / AST: 86 U/L / GGT: x           CBC:                        10.4   10.20 )-----------( 143      ( 04 Jul 2020 03:50 )             31.6     Coags:  PT/INR - ( 03 Jul 2020 06:48 )   PT: 21.8 sec;   INR: 2.01 ratio         PTT - ( 03 Jul 2020 23:23 )  PTT:94.8 sec        ## Cardiac  CARDIAC MARKERS ( 04 Jul 2020 03:50 )  6.770 ng/mL / x     / x     / x     / x      CARDIAC MARKERS ( 03 Jul 2020 23:23 )  8.290 ng/mL / x     / x     / x     / x      CARDIAC MARKERS ( 03 Jul 2020 06:48 )  5.940 ng/mL / x     / 478 U/L / x     / 23.5 ng/mL  CARDIAC MARKERS ( 03 Jul 2020 03:01 )  2.180 ng/mL / x     / 430 U/L / x     / x      CARDIAC MARKERS ( 02 Jul 2020 23:57 )  .134 ng/mL / x     / x     / x     / x            ## Blood Gas  ABG - ( 03 Jul 2020 10:43 )  pH, Arterial: x     pH, Blood: 7.46  /  pCO2: 37    /  pO2: 79    / HCO3: 25    / Base Excess: 2.0   /  SaO2: 97                  #I/Os  I&O's Detail    03 Jul 2020 07:01  -  04 Jul 2020 07:00  --------------------------------------------------------  IN:    dextrose 5% + sodium chloride 0.45%.: 1350 mL    heparin  Infusion.: 141.5 mL    insulin regular Infusion: 28 mL    IV PiggyBack: 1000 mL    norepinephrine Infusion: 95.8 mL    propofol Infusion: 48.8 mL  Total IN: 2664.1 mL    OUT:    Indwelling Catheter - Urethral: 60 mL    Voided: 30 mL  Total OUT: 90 mL    Total NET: 2574.1 mL          ## Imaging:    ## Medications:  MEDICATIONS  (STANDING):  aspirin  chewable 81 milliGRAM(s) Oral daily  chlorhexidine 0.12% Liquid 15 milliLiter(s) Oral Mucosa every 12 hours  chlorhexidine 4% Liquid 1 Application(s) Topical <User Schedule>  clopidogrel Tablet 75 milliGRAM(s) Oral daily  dextrose 5% + sodium chloride 0.45%. 1000 milliLiter(s) (75 mL/Hr) IV Continuous <Continuous>  dextrose 5%. 1000 milliLiter(s) (50 mL/Hr) IV Continuous <Continuous>  dextrose 50% Injectable 50 milliLiter(s) IV Push every 15 minutes  dextrose 50% Injectable 25 milliLiter(s) IV Push every 15 minutes  heparin  Infusion.  Unit(s)/Hr (8 mL/Hr) IV Continuous <Continuous>  insulin glargine Injectable (LANTUS) 10 Unit(s) SubCutaneous every morning  insulin lispro (HumaLOG) corrective regimen sliding scale   SubCutaneous every 4 hours  levETIRAcetam  IVPB 500 milliGRAM(s) IV Intermittent every 12 hours  levETIRAcetam  IVPB      norepinephrine Infusion 0.05 MICROgram(s)/kG/Min (3.19 mL/Hr) IV Continuous <Continuous>  pantoprazole  Injectable 40 milliGRAM(s) IV Push daily  piperacillin/tazobactam IVPB.. 3.375 Gram(s) IV Intermittent every 12 hours    MEDICATIONS  (PRN):  dextrose 40% Gel 15 Gram(s) Oral once PRN Blood Glucose LESS THAN 70 milliGRAM(s)/deciliter  glucagon  Injectable 1 milliGRAM(s) IntraMuscular once PRN Glucose LESS THAN 70 milligrams/deciliter HPI:  History obtain from ED physician as pt was intubated and no family around.     74 year old man with past medical history of HTN, DM, ESRD on HD (MWF), and CAD s/p CABG. Patient had no recent illness. Went to PMD yesterday for an appointment and told everything was fine. Patient was eating dinner and suddenly felt weak and collapsed. Down time before EMS arrived was 5 mins.   CPR was then initiated by EMS. Patient got 5 rounds of epi and ROSC was obtained after 40 mins. Patient is intubated.     CT brain showed grossly no intracranial hemorrhage or mass effect.   Chest CT scan reported Endotracheal tube, tip identified near the nacho with slight extension to right mainstem bronchus. Consider retraction of 4 cm for optimal positioning. No central pulmonary embolism. Evaluation of segmental to subsegmental branches limited secondary to suboptimal contrast bolus within these vessels. Postoperative changes of CABG. Cardiomegaly without significant pericardial effusion. Large right pleural effusion extending to the apex with complete right lower lobe atelectasis. Scattered groundglass opacities, most pronounced in bilateral lower lobes. Mild interlobular septal thickening. Findings are nonspecific, this may be on the basis of pulmonary edema. Recommend clinical correlation to assess for superimposed infection of viral and/or bacterial etiology. Suggestion of round atelectasis and/or scarring in the lingula. Incompletely characterize nodular densities in left lower lobes. Mild gallbladder wall edema.  Mild dilatation of the thoracic esophagus containing ingested material. Patient remains at increased risk for aspiration ascites. Mildly displaced fractures of the right anterolateral fifth and sixth ribs. Subcutaneous air and edema partially imaged in left upper extremity distally.    Patient will be admitted to the ICU for further management of acute respiratory failure, s/p cardiac arrest, CKD on HD, fluid overload, pulmonary edema, right pleural effusion, aspiration pneumonia, hyperglycemia, elevated troponin, possible sepsis and acidosis. (03 Jul 2020 01:15)      24 hr events: No acute events. Unable to provide history.    ## ROS:  [x ] unable to obtain    ## Vitals  ICU Vital Signs Last 24 Hrs  T(C): 36.5 (04 Jul 2020 06:00), Max: 37 (03 Jul 2020 15:46)  T(F): 97.7 (04 Jul 2020 06:00), Max: 98.6 (03 Jul 2020 15:46)  HR: 69 (04 Jul 2020 07:00) (64 - 123)  BP: 120/53 (04 Jul 2020 07:00) (90/53 - 164/63)  BP(mean): 68 (04 Jul 2020 07:00) (52 - 96)  ABP: --  ABP(mean): --  RR: 23 (04 Jul 2020 07:00) (14 - 26)  SpO2: 100% (04 Jul 2020 07:00) (96% - 100%)      ## Physical Exam:  Gen: Elderly male lying in bed, NAD  HEENT: NC/AT sclerae anicteric  Resp: Mechanical breath sounds b/l. overbreathing vent  CV: S1, S2  Abd: Soft, + BS  Ext: + edema  Neuro: No Gag, corneal, or pupillary reflex. + myoclonic jerks    ## Vent Data  Mode: AC/ CMV (Assist Control/ Continuous Mandatory Ventilation)  RR (machine): 14  TV (machine): 450  FiO2: 40  PEEP: 5  ITime: 0.9  MAP: 13  PIP: 29      ## Labs:  Chem:  07-04    136  |  98  |  62<H>  ----------------------------<  183<H>  5.0   |  24  |  5.92<H>    Ca    9.0      04 Jul 2020 03:50  Phos  3.4     07-04  Mg     2.1     07-04    TPro  5.9<L>  /  Alb  2.1<L>  /  TBili  0.6  /  DBili  x   /  AST  86<H>  /  ALT  53  /  AlkPhos  111  07-04    LIVER FUNCTIONS - ( 04 Jul 2020 03:50 )  Alb: 2.1 g/dL / Pro: 5.9 gm/dL / ALK PHOS: 111 U/L / ALT: 53 U/L / AST: 86 U/L / GGT: x           CBC:                        10.4   10.20 )-----------( 143      ( 04 Jul 2020 03:50 )             31.6     Coags:  PT/INR - ( 03 Jul 2020 06:48 )   PT: 21.8 sec;   INR: 2.01 ratio         PTT - ( 03 Jul 2020 23:23 )  PTT:94.8 sec        ## Cardiac  CARDIAC MARKERS ( 04 Jul 2020 03:50 )  6.770 ng/mL / x     / x     / x     / x      CARDIAC MARKERS ( 03 Jul 2020 23:23 )  8.290 ng/mL / x     / x     / x     / x      CARDIAC MARKERS ( 03 Jul 2020 06:48 )  5.940 ng/mL / x     / 478 U/L / x     / 23.5 ng/mL  CARDIAC MARKERS ( 03 Jul 2020 03:01 )  2.180 ng/mL / x     / 430 U/L / x     / x      CARDIAC MARKERS ( 02 Jul 2020 23:57 )  .134 ng/mL / x     / x     / x     / x            ## Blood Gas  ABG - ( 03 Jul 2020 10:43 )  pH, Arterial: x     pH, Blood: 7.46  /  pCO2: 37    /  pO2: 79    / HCO3: 25    / Base Excess: 2.0   /  SaO2: 97                  #I/Os  I&O's Detail    03 Jul 2020 07:01  -  04 Jul 2020 07:00  --------------------------------------------------------  IN:    dextrose 5% + sodium chloride 0.45%.: 1350 mL    heparin  Infusion.: 141.5 mL    insulin regular Infusion: 28 mL    IV PiggyBack: 1000 mL    norepinephrine Infusion: 95.8 mL    propofol Infusion: 48.8 mL  Total IN: 2664.1 mL    OUT:    Indwelling Catheter - Urethral: 60 mL    Voided: 30 mL  Total OUT: 90 mL    Total NET: 2574.1 mL          ## Imaging:    ## Medications:  MEDICATIONS  (STANDING):  aspirin  chewable 81 milliGRAM(s) Oral daily  chlorhexidine 0.12% Liquid 15 milliLiter(s) Oral Mucosa every 12 hours  chlorhexidine 4% Liquid 1 Application(s) Topical <User Schedule>  clopidogrel Tablet 75 milliGRAM(s) Oral daily  dextrose 5% + sodium chloride 0.45%. 1000 milliLiter(s) (75 mL/Hr) IV Continuous <Continuous>  dextrose 5%. 1000 milliLiter(s) (50 mL/Hr) IV Continuous <Continuous>  dextrose 50% Injectable 50 milliLiter(s) IV Push every 15 minutes  dextrose 50% Injectable 25 milliLiter(s) IV Push every 15 minutes  heparin  Infusion.  Unit(s)/Hr (8 mL/Hr) IV Continuous <Continuous>  insulin glargine Injectable (LANTUS) 10 Unit(s) SubCutaneous every morning  insulin lispro (HumaLOG) corrective regimen sliding scale   SubCutaneous every 4 hours  levETIRAcetam  IVPB 500 milliGRAM(s) IV Intermittent every 12 hours  levETIRAcetam  IVPB      norepinephrine Infusion 0.05 MICROgram(s)/kG/Min (3.19 mL/Hr) IV Continuous <Continuous>  pantoprazole  Injectable 40 milliGRAM(s) IV Push daily  piperacillin/tazobactam IVPB.. 3.375 Gram(s) IV Intermittent every 12 hours    MEDICATIONS  (PRN):  dextrose 40% Gel 15 Gram(s) Oral once PRN Blood Glucose LESS THAN 70 milliGRAM(s)/deciliter  glucagon  Injectable 1 milliGRAM(s) IntraMuscular once PRN Glucose LESS THAN 70 milligrams/deciliter

## 2020-07-04 NOTE — CHART NOTE - NSCHARTNOTEFT_GEN_A_CORE
Report received from Dr. Geiger neurology @ 10:01on EEG dated 7/3    Abnormal: markedly depressed cortical activity       LUIS GillC

## 2020-07-04 NOTE — PROGRESS NOTE ADULT - SUBJECTIVE AND OBJECTIVE BOX
NEPHROLOGY PROGRESS NOTE    CHIEF COMPLAINT:  ESRD    HPI:  Vented.  Nonresponsive.  Neurologic prognosis is felt to be grim and family wants to withdraw care soon.  EEG shows markedly decreased cortical activity.    EXAM:  T(F): 97.6 (07-04-20 @ 07:30)  HR: 72 (07-04-20 @ 11:44)  BP: 117/53 (07-04-20 @ 10:00)  RR: 22 (07-04-20 @ 10:00)  SpO2: 100% (07-04-20 @ 11:44)      Normal respiratory effort, lungs clear bilaterally  Heart RRR with no murmur, no peripheral edema    LABS                             10.4   10.20 )-----------( 143      ( 04 Jul 2020 03:50 )             31.6          07-04    136  |  98  |  62<H>  ----------------------------<  183<H>  5.0   |  24  |  5.92<H>    Ca    9.0      04 Jul 2020 03:50  Phos  3.4     07-04  Mg     2.1     07-04    TPro  5.9<L>  /  Alb  2.1<L>  /  TBili  0.6  /  DBili  x   /  AST  86<H>  /  ALT  53  /  AlkPhos  111  07-04             ASSESSMENT:  1.  s/p prolonged cardiac arrest with evidence of anoxic brain injury  2.  ESRD on hemodialysis    PLAN:  No absolute dialytic indications  Plan is to follow patient's wishes and withdraw care

## 2020-07-05 NOTE — DISCHARGE NOTE FOR THE EXPIRED PATIENT - HOSPITAL COURSE
75yo M with PMH w/ESRD on HD, CAD s/p CABG, DM admitted to ICU from ED on 7/3/20 post cardiac arrest. Prolonged downtime, ROSC obtained after 40 minutes. Likely anoxic brain injury; EEG shows markedly depressed to absent cortical activity throughout. Acute respiratory failure w/hypoxia and hypercapnia. Likely NSTEMI and multifactorial hypotension. After discussions with family, decision was made for compassionate wean on 7/5/20. Family arrived at bedside on 7/5,  was present & last rites were performed, and pt was extubated at 1pm. Comfort care provided. Pt pulseless with no spontaneous breaths at 13:34, pronounced at 13:35. Family present at bedside, emotional support given.

## 2020-07-05 NOTE — PROGRESS NOTE ADULT - SUBJECTIVE AND OBJECTIVE BOX
HPI:  History obtain from ED physician as pt was intubated and no family around.     74 year old man with past medical history of HTN, DM, ESRD on HD (MWF), and CAD s/p CABG. Patient had no recent illness. Went to [facility]D yesterday for an appointment and told everything was fine. Patient was eating dinner and suddenly felt weak and collapsed. Down time before EMS arrived was 5 mins.   CPR was then initiated by EMS. Patient got 5 rounds of epi and ROSC was obtained after 40 mins. Patient is intubated.     CT brain showed grossly no intracranial hemorrhage or mass effect.   Chest CT scan reported Endotracheal tube, tip identified near the nacho with slight extension to right mainstem bronchus. Consider retraction of 4 cm for optimal positioning. No central pulmonary embolism. Evaluation of segmental to subsegmental branches limited secondary to suboptimal contrast bolus within these vessels. Postoperative changes of CABG. Cardiomegaly without significant pericardial effusion. Large right pleural effusion extending to the apex with complete right lower lobe atelectasis. Scattered groundglass opacities, most pronounced in bilateral lower lobes. Mild interlobular septal thickening. Findings are nonspecific, this may be on the basis of pulmonary edema. Recommend clinical correlation to assess for superimposed infection of viral and/or bacterial etiology. Suggestion of round atelectasis and/or scarring in the lingula. Incompletely characterize nodular densities in left lower lobes. Mild gallbladder wall edema.  Mild dilatation of the thoracic esophagus containing ingested material. Patient remains at increased risk for aspiration ascites. Mildly displaced fractures of the right anterolateral fifth and sixth ribs. Subcutaneous air and edema partially imaged in left upper extremity distally.    Patient will be admitted to the ICU for further management of acute respiratory failure, s/p cardiac arrest, CKD on HD, fluid overload, pulmonary edema, right pleural effusion, aspiration pneumonia, hyperglycemia, elevated troponin, possible sepsis and acidosis. (03 Jul 2020 01:15)      24 hr events: No acute events. Unable to provide history.    ## ROS:  [x ] unable to obtain    ## Vitals  ICU Vital Signs Last 24 Hrs  T(C): 36 (05 Jul 2020 03:27), Max: 37 (04 Jul 2020 11:31)  T(F): 96.8 (05 Jul 2020 03:27), Max: 98.6 (04 Jul 2020 11:31)  HR: 53 (05 Jul 2020 07:25) (53 - 72)  BP: 117/36 (05 Jul 2020 07:00) (97/41 - 130/53)  BP(mean): 56 (05 Jul 2020 07:00) (50 - 73)  ABP: --  ABP(mean): --  RR: 15 (05 Jul 2020 07:25) (15 - 34)  SpO2: 100% (05 Jul 2020 07:25) (96% - 100%)      ## Physical Exam:  Gen: Elderly male lying in bed, NAD  HEENT: NC/AT sclerae anicteric  Resp: Mechanical breath sounds b/l. overbreathing vent  CV: S1, S2  Abd: Soft, + BS  Ext: + edema  Neuro: No Gag, corneal, or pupillary reflex. + myoclonic jerks    ## Vent Data  Mode: AC/ CMV (Assist Control/ Continuous Mandatory Ventilation)  RR (machine): 14  TV (machine): 450  FiO2: 40  PEEP: 5  ITime: 1  MAP: 9  PIP: 27      ## Labs:  Chem:  07-05    134<L>  |  95<L>  |  75<H>  ----------------------------<  110<H>  5.3   |  26  |  6.57<H>    Ca    8.6      05 Jul 2020 05:07  Phos  4.7     07-05  Mg     2.1     07-05    TPro  5.9<L>  /  Alb  2.1<L>  /  TBili  0.6  /  DBili  x   /  AST  86<H>  /  ALT  53  /  AlkPhos  111  07-04    LIVER FUNCTIONS - ( 04 Jul 2020 03:50 )  Alb: 2.1 g/dL / Pro: 5.9 gm/dL / ALK PHOS: 111 U/L / ALT: 53 U/L / AST: 86 U/L / GGT: x           CBC:                        9.1    15.72 )-----------( 128      ( 05 Jul 2020 05:07 )             26.8     Coags:  PTT - ( 05 Jul 2020 07:00 )  PTT:52.3 sec        ## Cardiac  CARDIAC MARKERS ( 04 Jul 2020 03:50 )  6.770 ng/mL / x     / x     / x     / x      CARDIAC MARKERS ( 03 Jul 2020 23:23 )  8.290 ng/mL / x     / x     / x     / x            ## Blood Gas  ABG - ( 03 Jul 2020 10:43 )  pH, Arterial: x     pH, Blood: 7.46  /  pCO2: 37    /  pO2: 79    / HCO3: 25    / Base Excess: 2.0   /  SaO2: 97                  #I/Os  I&O's Detail    04 Jul 2020 07:01  -  05 Jul 2020 07:00  --------------------------------------------------------  IN:    dextrose 5% + sodium chloride 0.45%.: 1800 mL    heparin  Infusion.: 150 mL    IV PiggyBack: 350 mL    norepinephrine Infusion: 76.8 mL  Total IN: 2376.8 mL    OUT:  Total OUT: 0 mL    Total NET: 2376.8 mL          ## Imaging:    ## Medications:  MEDICATIONS  (STANDING):  aspirin  chewable 81 milliGRAM(s) Oral daily  chlorhexidine 0.12% Liquid 15 milliLiter(s) Oral Mucosa every 12 hours  chlorhexidine 4% Liquid 1 Application(s) Topical <User Schedule>  clopidogrel Tablet 75 milliGRAM(s) Oral daily  dextrose 5% + sodium chloride 0.45%. 1000 milliLiter(s) (75 mL/Hr) IV Continuous <Continuous>  dextrose 5%. 1000 milliLiter(s) (50 mL/Hr) IV Continuous <Continuous>  dextrose 50% Injectable 50 milliLiter(s) IV Push every 15 minutes  dextrose 50% Injectable 25 milliLiter(s) IV Push every 15 minutes  heparin  Infusion.  Unit(s)/Hr (8 mL/Hr) IV Continuous <Continuous>  insulin glargine Injectable (LANTUS) 10 Unit(s) SubCutaneous every morning  insulin lispro (HumaLOG) corrective regimen sliding scale   SubCutaneous every 4 hours  levETIRAcetam  IVPB 500 milliGRAM(s) IV Intermittent every 12 hours  levETIRAcetam  IVPB      norepinephrine Infusion 0.05 MICROgram(s)/kG/Min (3.19 mL/Hr) IV Continuous <Continuous>  pantoprazole  Injectable 40 milliGRAM(s) IV Push daily  petrolatum Ophthalmic Ointment 1 Application(s) Both EYES four times a day  piperacillin/tazobactam IVPB.. 3.375 Gram(s) IV Intermittent every 12 hours    MEDICATIONS  (PRN):  dextrose 40% Gel 15 Gram(s) Oral once PRN Blood Glucose LESS THAN 70 milliGRAM(s)/deciliter  glucagon  Injectable 1 milliGRAM(s) IntraMuscular once PRN Glucose LESS THAN 70 milligrams/deciliter

## 2020-07-05 NOTE — PROGRESS NOTE ADULT - ASSESSMENT
75 y/o M w/ESRD on HD, CAD s/p CABG, DM admitted post cardiac arrest. Prolonged downtime. Likely anoxic brain injury. Seizures vs myoclonus. Acute respiratory failure w/hypoxia and hypercapnia. Likely NSTEMI. Hypotension likely multifactorial.    - Keppra for possible seizures/myoclonus  - Continue mechanical ventilation  - Pressor support as needed goal MAP >= 65  - Heparin gtt, ASA, plavix, statin  - HD as per renal  - Insulin for hyperglycemia  - Empiric broad spectrum abx  - Poor prognosis, possible withdrawal of care    I have personally provided 35 minutes of attending critical care time

## 2020-07-05 NOTE — PROGRESS NOTE ADULT - SUBJECTIVE AND OBJECTIVE BOX
NEPHROLOGY PROGRESS NOTE    CHIEF COMPLAINT:  ESRD    HPI:  Remains vented, low dose pressors, no meaningful response.  Plan for terminal wean today.    EXAM:  T(F): 95.8 (07-05-20 @ 07:30)  HR: 56 (07-05-20 @ 11:44)  BP: 116/43 (07-05-20 @ 11:30)  RR: 22 (07-05-20 @ 11:30)  SpO2: 100% (07-05-20 @ 11:44)      Normal respiratory effort, lungs clear bilaterally  Heart RRR with no murmur, no peripheral edema    LABS                             9.1    15.72 )-----------( 128      ( 05 Jul 2020 05:07 )             26.8          07-05    134<L>  |  95<L>  |  75<H>  ----------------------------<  110<H>  5.3   |  26  |  6.57<H>    Ca    8.6      05 Jul 2020 05:07  Phos  4.7     07-05  Mg     2.1     07-05    TPro  5.9<L>  /  Alb  2.1<L>  /  TBili  0.6  /  DBili  x   /  AST  86<H>  /  ALT  53  /  AlkPhos  111  07-04             ASSESSMENT:  1.  s/p prolonged cardiac arrest with evidence of anoxic brain injury  2.  ESRD on hemodialysis    PLAN:  Dialysis would be medically futile  Terminal wean today

## 2020-07-08 LAB
CULTURE RESULTS: SIGNIFICANT CHANGE UP
CULTURE RESULTS: SIGNIFICANT CHANGE UP
SPECIMEN SOURCE: SIGNIFICANT CHANGE UP
SPECIMEN SOURCE: SIGNIFICANT CHANGE UP
